# Patient Record
Sex: MALE | Race: WHITE | NOT HISPANIC OR LATINO | Employment: OTHER | URBAN - NONMETROPOLITAN AREA
[De-identification: names, ages, dates, MRNs, and addresses within clinical notes are randomized per-mention and may not be internally consistent; named-entity substitution may affect disease eponyms.]

---

## 2017-05-08 ENCOUNTER — HOSPITAL ENCOUNTER (OUTPATIENT)
Dept: GENERAL RADIOLOGY | Facility: HOSPITAL | Age: 62
Discharge: HOME | End: 2017-05-08
Attending: INTERNAL MEDICINE | Admitting: INTERNAL MEDICINE

## 2017-05-08 ENCOUNTER — TRANSCRIBE ORDERS (OUTPATIENT)
Dept: ADMINISTRATIVE | Facility: HOSPITAL | Age: 62
End: 2017-05-08

## 2017-05-08 ENCOUNTER — LAB (OUTPATIENT)
Dept: LAB | Facility: HOSPITAL | Age: 62
End: 2017-05-08
Attending: INTERNAL MEDICINE

## 2017-05-08 ENCOUNTER — HOSPITAL ENCOUNTER (OUTPATIENT)
Dept: RESPIRATORY THERAPY | Facility: HOSPITAL | Age: 62
Discharge: HOME | End: 2017-05-08
Attending: INTERNAL MEDICINE

## 2017-05-08 DIAGNOSIS — Z01.818 PREOP EXAMINATION: ICD-10-CM

## 2017-05-08 DIAGNOSIS — Z01.818 PREOP EXAMINATION: Primary | ICD-10-CM

## 2017-05-08 LAB
ALBUMIN SERPL-MCNC: 4.4 G/DL (ref 3.4–4.8)
ALBUMIN/GLOB SERPL: 1.4 G/DL (ref 1.5–2.5)
ALP SERPL-CCNC: 106 U/L (ref 40–129)
ALT SERPL W P-5'-P-CCNC: 23 U/L (ref 10–44)
ANION GAP SERPL CALCULATED.3IONS-SCNC: 3.9 MMOL/L (ref 3.6–11.2)
AST SERPL-CCNC: 23 U/L (ref 10–34)
BASOPHILS # BLD AUTO: 0.02 10*3/MM3 (ref 0–0.3)
BASOPHILS NFR BLD AUTO: 0.3 % (ref 0–2)
BILIRUB SERPL-MCNC: 1.4 MG/DL (ref 0.2–1.8)
BILIRUB UR QL STRIP: NEGATIVE
BUN BLD-MCNC: 13 MG/DL (ref 7–21)
BUN/CREAT SERPL: 13.1 (ref 7–25)
CALCIUM SPEC-SCNC: 9.8 MG/DL (ref 7.7–10)
CHLORIDE SERPL-SCNC: 106 MMOL/L (ref 99–112)
CLARITY UR: CLEAR
CO2 SERPL-SCNC: 31.1 MMOL/L (ref 24.3–31.9)
COLOR UR: YELLOW
CREAT BLD-MCNC: 0.99 MG/DL (ref 0.43–1.29)
DEPRECATED RDW RBC AUTO: 47.1 FL (ref 37–54)
EOSINOPHIL # BLD AUTO: 0.2 10*3/MM3 (ref 0–0.7)
EOSINOPHIL NFR BLD AUTO: 3 % (ref 0–5)
ERYTHROCYTE [DISTWIDTH] IN BLOOD BY AUTOMATED COUNT: 13.9 % (ref 11.5–14.5)
GFR SERPL CREATININE-BSD FRML MDRD: 77 ML/MIN/1.73
GLOBULIN UR ELPH-MCNC: 3.1 GM/DL
GLUCOSE BLD-MCNC: 94 MG/DL (ref 70–110)
GLUCOSE UR STRIP-MCNC: NEGATIVE MG/DL
HCT VFR BLD AUTO: 48.9 % (ref 42–52)
HGB BLD-MCNC: 16.5 G/DL (ref 14–18)
HGB UR QL STRIP.AUTO: NEGATIVE
IMM GRANULOCYTES # BLD: 0.02 10*3/MM3 (ref 0–0.03)
IMM GRANULOCYTES NFR BLD: 0.3 % (ref 0–0.5)
INR PPP: 0.99 (ref 0.8–1.1)
KETONES UR QL STRIP: NEGATIVE
LEUKOCYTE ESTERASE UR QL STRIP.AUTO: NEGATIVE
LYMPHOCYTES # BLD AUTO: 1.89 10*3/MM3 (ref 1–3)
LYMPHOCYTES NFR BLD AUTO: 28.8 % (ref 21–51)
MCH RBC QN AUTO: 31.5 PG (ref 27–33)
MCHC RBC AUTO-ENTMCNC: 33.7 G/DL (ref 33–37)
MCV RBC AUTO: 93.3 FL (ref 80–94)
MONOCYTES # BLD AUTO: 0.74 10*3/MM3 (ref 0.1–0.9)
MONOCYTES NFR BLD AUTO: 11.3 % (ref 0–10)
NEUTROPHILS # BLD AUTO: 3.69 10*3/MM3 (ref 1.4–6.5)
NEUTROPHILS NFR BLD AUTO: 56.3 % (ref 30–70)
NITRITE UR QL STRIP: NEGATIVE
OSMOLALITY SERPL CALC.SUM OF ELEC: 281.1 MOSM/KG (ref 273–305)
PH UR STRIP.AUTO: 6.5 [PH] (ref 5–8)
PLATELET # BLD AUTO: 165 10*3/MM3 (ref 130–400)
PMV BLD AUTO: 11.6 FL (ref 6–10)
POTASSIUM BLD-SCNC: 4.2 MMOL/L (ref 3.5–5.3)
PROT SERPL-MCNC: 7.5 G/DL (ref 6–8)
PROT UR QL STRIP: NEGATIVE
PROTHROMBIN TIME: 11 SECONDS (ref 9.8–11.9)
RBC # BLD AUTO: 5.24 10*6/MM3 (ref 4.7–6.1)
SODIUM BLD-SCNC: 141 MMOL/L (ref 135–153)
SP GR UR STRIP: 1.02 (ref 1–1.03)
UROBILINOGEN UR QL STRIP: NORMAL
WBC NRBC COR # BLD: 6.56 10*3/MM3 (ref 4.5–12.5)

## 2017-05-08 PROCEDURE — 81003 URINALYSIS AUTO W/O SCOPE: CPT | Performed by: INTERNAL MEDICINE

## 2017-05-08 PROCEDURE — 93005 ELECTROCARDIOGRAM TRACING: CPT | Performed by: INTERNAL MEDICINE

## 2017-05-08 PROCEDURE — 93010 ELECTROCARDIOGRAM REPORT: CPT | Performed by: INTERNAL MEDICINE

## 2017-05-08 PROCEDURE — 71020 XR CHEST PA AND LATERAL: CPT | Performed by: RADIOLOGY

## 2017-05-08 PROCEDURE — 85610 PROTHROMBIN TIME: CPT | Performed by: INTERNAL MEDICINE

## 2017-05-08 PROCEDURE — 80053 COMPREHEN METABOLIC PANEL: CPT | Performed by: INTERNAL MEDICINE

## 2017-05-08 PROCEDURE — 71020 HC CHEST PA AND LATERAL: CPT

## 2017-05-08 PROCEDURE — 85025 COMPLETE CBC W/AUTO DIFF WBC: CPT | Performed by: INTERNAL MEDICINE

## 2017-05-08 PROCEDURE — 36415 COLL VENOUS BLD VENIPUNCTURE: CPT

## 2017-06-22 ENCOUNTER — APPOINTMENT (OUTPATIENT)
Dept: ULTRASOUND IMAGING | Facility: HOSPITAL | Age: 62
End: 2017-06-22
Attending: EMERGENCY MEDICINE

## 2017-06-22 ENCOUNTER — HOSPITAL ENCOUNTER (EMERGENCY)
Facility: HOSPITAL | Age: 62
Discharge: HOME | End: 2017-06-22
Attending: EMERGENCY MEDICINE | Admitting: EMERGENCY MEDICINE

## 2017-06-22 ENCOUNTER — APPOINTMENT (OUTPATIENT)
Dept: CT IMAGING | Facility: HOSPITAL | Age: 62
End: 2017-06-22

## 2017-06-22 VITALS
RESPIRATION RATE: 19 BRPM | WEIGHT: 231 LBS | DIASTOLIC BLOOD PRESSURE: 89 MMHG | SYSTOLIC BLOOD PRESSURE: 149 MMHG | BODY MASS INDEX: 35.01 KG/M2 | OXYGEN SATURATION: 99 % | TEMPERATURE: 98.1 F | HEART RATE: 71 BPM | HEIGHT: 68 IN

## 2017-06-22 DIAGNOSIS — R06.02 SHORTNESS OF BREATH: Primary | ICD-10-CM

## 2017-06-22 LAB
ALBUMIN SERPL-MCNC: 4.1 G/DL (ref 3.4–4.8)
ALBUMIN/GLOB SERPL: 1.4 G/DL (ref 1.5–2.5)
ALP SERPL-CCNC: 98 U/L (ref 40–129)
ALT SERPL W P-5'-P-CCNC: 26 U/L (ref 10–44)
ANION GAP SERPL CALCULATED.3IONS-SCNC: 3.5 MMOL/L (ref 3.6–11.2)
APTT PPP: 25.7 SECONDS (ref 24.4–31)
AST SERPL-CCNC: 25 U/L (ref 10–34)
BASOPHILS # BLD AUTO: 0.02 10*3/MM3 (ref 0–0.3)
BASOPHILS NFR BLD AUTO: 0.3 % (ref 0–2)
BILIRUB SERPL-MCNC: 0.4 MG/DL (ref 0.2–1.8)
BUN BLD-MCNC: 14 MG/DL (ref 7–21)
BUN/CREAT SERPL: 15.6 (ref 7–25)
CALCIUM SPEC-SCNC: 9.6 MG/DL (ref 7.7–10)
CHLORIDE SERPL-SCNC: 109 MMOL/L (ref 99–112)
CK MB SERPL-CCNC: 1.01 NG/ML (ref 0–5)
CK MB SERPL-RTO: 1.7 % (ref 0–3)
CK SERPL-CCNC: 58 U/L (ref 24–204)
CO2 SERPL-SCNC: 30.5 MMOL/L (ref 24.3–31.9)
CREAT BLD-MCNC: 0.9 MG/DL (ref 0.43–1.29)
D DIMER PPP FEU-MCNC: 1.86 MG/L (FEU) (ref 0–0.5)
DEPRECATED RDW RBC AUTO: 43.9 FL (ref 37–54)
EOSINOPHIL # BLD AUTO: 0.6 10*3/MM3 (ref 0–0.7)
EOSINOPHIL NFR BLD AUTO: 8.5 % (ref 0–5)
ERYTHROCYTE [DISTWIDTH] IN BLOOD BY AUTOMATED COUNT: 13.5 % (ref 11.5–14.5)
GFR SERPL CREATININE-BSD FRML MDRD: 86 ML/MIN/1.73
GLOBULIN UR ELPH-MCNC: 2.9 GM/DL
GLUCOSE BLD-MCNC: 101 MG/DL (ref 70–110)
HCT VFR BLD AUTO: 44.5 % (ref 42–52)
HGB BLD-MCNC: 15.1 G/DL (ref 14–18)
IMM GRANULOCYTES # BLD: 0.03 10*3/MM3 (ref 0–0.03)
IMM GRANULOCYTES NFR BLD: 0.4 % (ref 0–0.5)
INR PPP: 0.92 (ref 0.8–1.1)
LYMPHOCYTES # BLD AUTO: 2.03 10*3/MM3 (ref 1–3)
LYMPHOCYTES NFR BLD AUTO: 28.8 % (ref 21–51)
MCH RBC QN AUTO: 31.6 PG (ref 27–33)
MCHC RBC AUTO-ENTMCNC: 33.9 G/DL (ref 33–37)
MCV RBC AUTO: 93.1 FL (ref 80–94)
MONOCYTES # BLD AUTO: 0.82 10*3/MM3 (ref 0.1–0.9)
MONOCYTES NFR BLD AUTO: 11.6 % (ref 0–10)
MYOGLOBIN SERPL-MCNC: 43 NG/ML (ref 0–109)
NEUTROPHILS # BLD AUTO: 3.55 10*3/MM3 (ref 1.4–6.5)
NEUTROPHILS NFR BLD AUTO: 50.4 % (ref 30–70)
OSMOLALITY SERPL CALC.SUM OF ELEC: 285.6 MOSM/KG (ref 273–305)
PLATELET # BLD AUTO: 198 10*3/MM3 (ref 130–400)
PMV BLD AUTO: 11.1 FL (ref 6–10)
POTASSIUM BLD-SCNC: 4.3 MMOL/L (ref 3.5–5.3)
PROT SERPL-MCNC: 7 G/DL (ref 6–8)
PROTHROMBIN TIME: 10.2 SECONDS (ref 9.8–11.9)
RBC # BLD AUTO: 4.78 10*6/MM3 (ref 4.7–6.1)
SODIUM BLD-SCNC: 143 MMOL/L (ref 135–153)
TROPONIN I SERPL-MCNC: <0.006 NG/ML
WBC NRBC COR # BLD: 7.05 10*3/MM3 (ref 4.5–12.5)

## 2017-06-22 PROCEDURE — 93971 EXTREMITY STUDY: CPT

## 2017-06-22 PROCEDURE — 85379 FIBRIN DEGRADATION QUANT: CPT | Performed by: EMERGENCY MEDICINE

## 2017-06-22 PROCEDURE — 85730 THROMBOPLASTIN TIME PARTIAL: CPT | Performed by: EMERGENCY MEDICINE

## 2017-06-22 PROCEDURE — 96360 HYDRATION IV INFUSION INIT: CPT

## 2017-06-22 PROCEDURE — 83874 ASSAY OF MYOGLOBIN: CPT | Performed by: EMERGENCY MEDICINE

## 2017-06-22 PROCEDURE — 82553 CREATINE MB FRACTION: CPT | Performed by: EMERGENCY MEDICINE

## 2017-06-22 PROCEDURE — 93005 ELECTROCARDIOGRAM TRACING: CPT | Performed by: EMERGENCY MEDICINE

## 2017-06-22 PROCEDURE — 71275 CT ANGIOGRAPHY CHEST: CPT

## 2017-06-22 PROCEDURE — 85610 PROTHROMBIN TIME: CPT | Performed by: EMERGENCY MEDICINE

## 2017-06-22 PROCEDURE — 96361 HYDRATE IV INFUSION ADD-ON: CPT

## 2017-06-22 PROCEDURE — 99283 EMERGENCY DEPT VISIT LOW MDM: CPT

## 2017-06-22 PROCEDURE — 93971 EXTREMITY STUDY: CPT | Performed by: RADIOLOGY

## 2017-06-22 PROCEDURE — 84484 ASSAY OF TROPONIN QUANT: CPT | Performed by: EMERGENCY MEDICINE

## 2017-06-22 PROCEDURE — 0 IOPAMIDOL PER 1 ML: Performed by: EMERGENCY MEDICINE

## 2017-06-22 PROCEDURE — 71275 CT ANGIOGRAPHY CHEST: CPT | Performed by: RADIOLOGY

## 2017-06-22 PROCEDURE — 85025 COMPLETE CBC W/AUTO DIFF WBC: CPT | Performed by: EMERGENCY MEDICINE

## 2017-06-22 PROCEDURE — 80053 COMPREHEN METABOLIC PANEL: CPT | Performed by: EMERGENCY MEDICINE

## 2017-06-22 PROCEDURE — 82550 ASSAY OF CK (CPK): CPT | Performed by: EMERGENCY MEDICINE

## 2017-06-22 RX ORDER — SODIUM CHLORIDE 9 MG/ML
125 INJECTION, SOLUTION INTRAVENOUS CONTINUOUS
Status: DISCONTINUED | OUTPATIENT
Start: 2017-06-22 | End: 2017-06-22 | Stop reason: HOSPADM

## 2017-06-22 RX ORDER — MELOXICAM 7.5 MG/1
7.5 TABLET ORAL DAILY
COMMUNITY
End: 2019-05-07

## 2017-06-22 RX ORDER — AMOXICILLIN AND CLAVULANATE POTASSIUM 500; 125 MG/1; MG/1
1 TABLET, FILM COATED ORAL AS NEEDED
COMMUNITY
End: 2019-05-07

## 2017-06-22 RX ADMIN — SODIUM CHLORIDE 125 ML/HR: 9 INJECTION, SOLUTION INTRAVENOUS at 16:51

## 2017-06-22 RX ADMIN — SODIUM CHLORIDE 500 ML: 9 INJECTION, SOLUTION INTRAVENOUS at 17:15

## 2017-06-22 RX ADMIN — IOPAMIDOL 100 ML: 755 INJECTION, SOLUTION INTRAVENOUS at 18:13

## 2017-06-23 NOTE — DISCHARGE INSTRUCTIONS
Home to rest in care of family.  See Dr. Iglesias in the office tomorrow.  Return the emergency Department right away if symptoms worsen/any problems.    Hypertension  Hypertension, commonly called high blood pressure, is when the force of blood pumping through your arteries is too strong. Your arteries are the blood vessels that carry blood from your heart throughout your body. A blood pressure reading consists of a higher number over a lower number, such as 110/72. The higher number (systolic) is the pressure inside your arteries when your heart pumps. The lower number (diastolic) is the pressure inside your arteries when your heart relaxes. Ideally you want your blood pressure below 120/80.  Hypertension forces your heart to work harder to pump blood. Your arteries may become narrow or stiff. Having untreated or uncontrolled hypertension can cause heart attack, stroke, kidney disease, and other problems.  RISK FACTORS  Some risk factors for high blood pressure are controllable. Others are not.   Risk factors you cannot control include:   · Race. You may be at higher risk if you are .  · Age. Risk increases with age.  · Gender. Men are at higher risk than women before age 45 years. After age 65, women are at higher risk than men.  Risk factors you can control include:  · Not getting enough exercise or physical activity.  · Being overweight.  · Getting too much fat, sugar, calories, or salt in your diet.  · Drinking too much alcohol.  SIGNS AND SYMPTOMS  Hypertension does not usually cause signs or symptoms. Extremely high blood pressure (hypertensive crisis) may cause headache, anxiety, shortness of breath, and nosebleed.  DIAGNOSIS  To check if you have hypertension, your health care provider will measure your blood pressure while you are seated, with your arm held at the level of your heart. It should be measured at least twice using the same arm. Certain conditions can cause a difference in blood  pressure between your right and left arms. A blood pressure reading that is higher than normal on one occasion does not mean that you need treatment. If it is not clear whether you have high blood pressure, you may be asked to return on a different day to have your blood pressure checked again. Or, you may be asked to monitor your blood pressure at home for 1 or more weeks.  TREATMENT  Treating high blood pressure includes making lifestyle changes and possibly taking medicine. Living a healthy lifestyle can help lower high blood pressure. You may need to change some of your habits.  Lifestyle changes may include:  · Following the DASH diet. This diet is high in fruits, vegetables, and whole grains. It is low in salt, red meat, and added sugars.  · Keep your sodium intake below 2,300 mg per day.  · Getting at least 30-45 minutes of aerobic exercise at least 4 times per week.  · Losing weight if necessary.  · Not smoking.  · Limiting alcoholic beverages.  · Learning ways to reduce stress.  Your health care provider may prescribe medicine if lifestyle changes are not enough to get your blood pressure under control, and if one of the following is true:  · You are 18-59 years of age and your systolic blood pressure is above 140.  · You are 60 years of age or older, and your systolic blood pressure is above 150.  · Your diastolic blood pressure is above 90.  · You have diabetes, and your systolic blood pressure is over 140 or your diastolic blood pressure is over 90.  · You have kidney disease and your blood pressure is above 140/90.  · You have heart disease and your blood pressure is above 140/90.  Your personal target blood pressure may vary depending on your medical conditions, your age, and other factors.  HOME CARE INSTRUCTIONS  · Have your blood pressure rechecked as directed by your health care provider.    · Take medicines only as directed by your health care provider. Follow the directions carefully. Blood  pressure medicines must be taken as prescribed. The medicine does not work as well when you skip doses. Skipping doses also puts you at risk for problems.  · Do not smoke.    · Monitor your blood pressure at home as directed by your health care provider.   SEEK MEDICAL CARE IF:   · You think you are having a reaction to medicines taken.  · You have recurrent headaches or feel dizzy.  · You have swelling in your ankles.  · You have trouble with your vision.  SEEK IMMEDIATE MEDICAL CARE IF:  · You develop a severe headache or confusion.  · You have unusual weakness, numbness, or feel faint.  · You have severe chest or abdominal pain.  · You vomit repeatedly.  · You have trouble breathing.  MAKE SURE YOU:   · Understand these instructions.  · Will watch your condition.  · Will get help right away if you are not doing well or get worse.     This information is not intended to replace advice given to you by your health care provider. Make sure you discuss any questions you have with your health care provider.     Document Released: 12/18/2006 Document Revised: 05/03/2016 Document Reviewed: 10/10/2014  [employer] Interactive Patient Education ©2017 [employer]

## 2017-06-23 NOTE — ED PROVIDER NOTES
Subjective   HPI Comments: Patient is 62-year-old white male who is sent here from Dr. Iglesias's office today for evaluation of shortness of breath.  He states that he has been feeling mildly and vaguely short of breath off and on now for about 2 weeks, seems to been more prominent over the last 2 days.  He did have a partial left knee replacement 4 weeks ago, states that his left leg has been swollen since that time but that if his service and steadily been improving, and is much better now than it was initially.  He was seen at Dr. Iglesias's office this afternoon and was sent here for further evaluation.  No fever, chills, chest pain, cough, abdominal pain, nausea, vomiting, diaphoresis, any other associated symptoms or other complaints.    Patient is a 62 y.o. male presenting with shortness of breath.   History provided by:  Patient and spouse  Shortness of Breath   Associated symptoms: no abdominal pain, no chest pain, no claudication, no cough, no diaphoresis, no fever, no headaches, no hemoptysis, no neck pain, no PND, no rash, no sore throat, no sputum production, no syncope, no vomiting and no wheezing        Review of Systems   Constitutional: Negative for activity change, appetite change, diaphoresis and fever.   HENT: Negative for congestion, ear discharge, sinus pressure and sore throat.    Eyes: Negative for photophobia and pain.   Respiratory: Positive for shortness of breath. Negative for cough, hemoptysis, sputum production, wheezing and stridor.    Cardiovascular: Negative for chest pain, claudication, leg swelling, syncope and PND.   Gastrointestinal: Negative for abdominal distention, abdominal pain, diarrhea, nausea and vomiting.   Endocrine: Negative for polydipsia and polyphagia.   Genitourinary: Negative for difficulty urinating and flank pain.   Musculoskeletal: Negative for arthralgias, myalgias, neck pain and neck stiffness.   Skin: Negative for color change and rash.   Neurological: Negative  for dizziness, seizures, syncope and headaches.   Psychiatric/Behavioral: Negative for confusion.   All other systems reviewed and are negative.      Past Medical History:   Diagnosis Date   • Arthritis    • Atrial fibrillation     noted during stress test 7/2015. Spont. converted to SR.   • BPH (benign prostatic hyperplasia)    • GERD (gastroesophageal reflux disease)        Allergies   Allergen Reactions   • Sulfa Antibiotics        Past Surgical History:   Procedure Laterality Date   • CARPAL TUNNEL RELEASE Bilateral    • CHOLECYSTECTOMY     • KNEE SURGERY Left        Family History   Problem Relation Age of Onset   • Heart attack Father        Social History     Social History   • Marital status:      Spouse name: N/A   • Number of children: N/A   • Years of education: N/A     Social History Main Topics   • Smoking status: Never Smoker   • Smokeless tobacco: None      Comment: history of smokeless tobacco use.   • Alcohol use No   • Drug use: No   • Sexual activity: Defer     Other Topics Concern   • None     Social History Narrative           Objective   Physical Exam   Constitutional: He is oriented to person, place, and time. He appears well-developed and well-nourished. No distress.   Patient is in very good spirits.  He appears comfortable and well.   HENT:   Head: Normocephalic and atraumatic.   Eyes: EOM are normal. Pupils are equal, round, and reactive to light. No scleral icterus.   Neck: Normal range of motion. Neck supple. No rigidity. No tracheal deviation present.   Cardiovascular: Normal rate, regular rhythm and intact distal pulses.    Pulmonary/Chest: Effort normal and breath sounds normal. No respiratory distress. He exhibits no tenderness.   Abdominal: Soft. Bowel sounds are normal. He exhibits no distension. There is no tenderness. There is no rebound and no guarding.   Musculoskeletal: Normal range of motion. He exhibits edema (Mild edema left lower extremity.  No redness or increased  warmth.  No Homans sign.). He exhibits no tenderness.   Neurological: He is alert and oriented to person, place, and time. He has normal strength. No sensory deficit. He exhibits normal muscle tone. Coordination normal. GCS eye subscore is 4. GCS verbal subscore is 5. GCS motor subscore is 6.   Skin: Skin is warm and dry. He is not diaphoretic. No cyanosis. No pallor.   Psychiatric: He has a normal mood and affect. His behavior is normal.   Vitals reviewed.      Procedures  CT Chest Pulmonary Embolism With Contrast   ED Interpretation   Per virtual radiology, no evidence for pulmonary embolus.      US Venous Doppler Lower Extremity Left (duplex)   ED Interpretation   Per virtual radiology, no evidence for DVT.      EKG shows sinus rhythm with no apparent acute ischemia.  Results for orders placed or performed during the hospital encounter of 06/22/17   Comprehensive Metabolic Panel   Result Value Ref Range    Glucose 101 70 - 110 mg/dL    BUN 14 7 - 21 mg/dL    Creatinine 0.90 0.43 - 1.29 mg/dL    Sodium 143 135 - 153 mmol/L    Potassium 4.3 3.5 - 5.3 mmol/L    Chloride 109 99 - 112 mmol/L    CO2 30.5 24.3 - 31.9 mmol/L    Calcium 9.6 7.7 - 10.0 mg/dL    Total Protein 7.0 6.0 - 8.0 g/dL    Albumin 4.10 3.40 - 4.80 g/dL    ALT (SGPT) 26 10 - 44 U/L    AST (SGOT) 25 10 - 34 U/L    Alkaline Phosphatase 98 40 - 129 U/L    Total Bilirubin 0.4 0.2 - 1.8 mg/dL    eGFR Non African Amer 86 >60 mL/min/1.73    Globulin 2.9 gm/dL    A/G Ratio 1.4 (L) 1.5 - 2.5 g/dL    BUN/Creatinine Ratio 15.6 7.0 - 25.0    Anion Gap 3.5 (L) 3.6 - 11.2 mmol/L   Protime-INR   Result Value Ref Range    Protime 10.2 9.8 - 11.9 Seconds    INR 0.92 0.80 - 1.10   aPTT   Result Value Ref Range    PTT 25.7 24.4 - 31.0 seconds   CK   Result Value Ref Range    Creatine Kinase 58 24 - 204 U/L   Myoglobin, Serum   Result Value Ref Range    Myoglobin 43.0 0.0 - 109.0 ng/mL   CK-MB   Result Value Ref Range    CKMB 1.01 0.00 - 5.00 ng/mL   Troponin   Result  Value Ref Range    Troponin I <0.006 <=0.040 ng/mL   D-dimer, Quantitative   Result Value Ref Range    D-Dimer, Quantitative 1.86 (C) 0.00 - 0.50 mg/L (FEU)   CBC Auto Differential   Result Value Ref Range    WBC 7.05 4.50 - 12.50 10*3/mm3    RBC 4.78 4.70 - 6.10 10*6/mm3    Hemoglobin 15.1 14.0 - 18.0 g/dL    Hematocrit 44.5 42.0 - 52.0 %    MCV 93.1 80.0 - 94.0 fL    MCH 31.6 27.0 - 33.0 pg    MCHC 33.9 33.0 - 37.0 g/dL    RDW 13.5 11.5 - 14.5 %    RDW-SD 43.9 37.0 - 54.0 fl    MPV 11.1 (H) 6.0 - 10.0 fL    Platelets 198 130 - 400 10*3/mm3    Neutrophil % 50.4 30.0 - 70.0 %    Lymphocyte % 28.8 21.0 - 51.0 %    Monocyte % 11.6 (H) 0.0 - 10.0 %    Eosinophil % 8.5 (H) 0.0 - 5.0 %    Basophil % 0.3 0.0 - 2.0 %    Immature Grans % 0.4 0.0 - 0.5 %    Neutrophils, Absolute 3.55 1.40 - 6.50 10*3/mm3    Lymphocytes, Absolute 2.03 1.00 - 3.00 10*3/mm3    Monocytes, Absolute 0.82 0.10 - 0.90 10*3/mm3    Eosinophils, Absolute 0.60 0.00 - 0.70 10*3/mm3    Basophils, Absolute 0.02 0.00 - 0.30 10*3/mm3    Immature Grans, Absolute 0.03 0.00 - 0.03 10*3/mm3   Osmolality, Calculated   Result Value Ref Range    Osmolality Calc 285.6 273.0 - 305.0 mOsm/kg   CK-MB Index   Result Value Ref Range    CK-MB Index 1.7 0.0 - 3.0 %              ED Course  ED Course   Patient's emergency department stay has been entirely uneventful.  Never has he shown any signs of distress.  I discussed case with Dr. Iglesias, who advises that we can admit the patient to the hospital for observation and further testing or, if patient prefers, we can discharge him to home to follow-up with Dr. Iglesias in the office tomorrow.  Patient prefers to go home and to follow-up with Dr. Iglesias in the office tomorrow.  He agrees that he will return to the emergency Department right away for symptoms worsen or if he has any problems.               MDM  Number of Diagnoses or Management Options  Shortness of breath:       Final diagnoses:   Shortness of breath              Please note that portions of this note were completed with a voice recognition program. Efforts were made to edit the dictations, but occasionally words are mistranscribed.       Temo Rodriguez MD  06/22/17 2129

## 2017-12-08 ENCOUNTER — LAB (OUTPATIENT)
Dept: LAB | Facility: HOSPITAL | Age: 62
End: 2017-12-08
Attending: INTERNAL MEDICINE

## 2017-12-08 ENCOUNTER — TRANSCRIBE ORDERS (OUTPATIENT)
Dept: ADMINISTRATIVE | Facility: HOSPITAL | Age: 62
End: 2017-12-08

## 2017-12-08 ENCOUNTER — HOSPITAL ENCOUNTER (OUTPATIENT)
Dept: CARDIOLOGY | Facility: HOSPITAL | Age: 62
Discharge: HOME | End: 2017-12-08
Attending: INTERNAL MEDICINE | Admitting: INTERNAL MEDICINE

## 2017-12-08 DIAGNOSIS — R60.0 LEG EDEMA: ICD-10-CM

## 2017-12-08 DIAGNOSIS — A41.9 SEPTICEMIC (HCC): Primary | ICD-10-CM

## 2017-12-08 DIAGNOSIS — R60.0 LEG EDEMA: Primary | ICD-10-CM

## 2017-12-08 DIAGNOSIS — A41.9 SEPTICEMIC (HCC): ICD-10-CM

## 2017-12-08 DIAGNOSIS — R53.82 CHRONIC FATIGUE: ICD-10-CM

## 2017-12-08 DIAGNOSIS — R60.0 LOCALIZED EDEMA: ICD-10-CM

## 2017-12-08 LAB
ALBUMIN SERPL-MCNC: 4.5 G/DL (ref 3.4–4.8)
ALBUMIN/GLOB SERPL: 1.6 G/DL (ref 1.5–2.5)
ALP SERPL-CCNC: 96 U/L (ref 40–129)
ALT SERPL W P-5'-P-CCNC: 27 U/L (ref 10–44)
ANION GAP SERPL CALCULATED.3IONS-SCNC: 5.7 MMOL/L (ref 3.6–11.2)
AST SERPL-CCNC: 23 U/L (ref 10–34)
BASOPHILS # BLD AUTO: 0.02 10*3/MM3 (ref 0–0.3)
BASOPHILS NFR BLD AUTO: 0.2 % (ref 0–2)
BILIRUB SERPL-MCNC: 0.7 MG/DL (ref 0.2–1.8)
BUN BLD-MCNC: 13 MG/DL (ref 7–21)
BUN/CREAT SERPL: 12.5 (ref 7–25)
CALCIUM SPEC-SCNC: 9.8 MG/DL (ref 7.7–10)
CHLORIDE SERPL-SCNC: 99 MMOL/L (ref 99–112)
CO2 SERPL-SCNC: 29.3 MMOL/L (ref 24.3–31.9)
CREAT BLD-MCNC: 1.04 MG/DL (ref 0.43–1.29)
CRP SERPL-MCNC: <0.5 MG/DL (ref 0–0.99)
D DIMER PPP FEU-MCNC: <0.27 MCGFEU/ML (ref 0–0.5)
DEPRECATED RDW RBC AUTO: 46.3 FL (ref 37–54)
EOSINOPHIL # BLD AUTO: 0.14 10*3/MM3 (ref 0–0.7)
EOSINOPHIL NFR BLD AUTO: 1.4 % (ref 0–5)
ERYTHROCYTE [DISTWIDTH] IN BLOOD BY AUTOMATED COUNT: 14.1 % (ref 11.5–14.5)
GFR SERPL CREATININE-BSD FRML MDRD: 73 ML/MIN/1.73
GLOBULIN UR ELPH-MCNC: 2.8 GM/DL
GLUCOSE BLD-MCNC: 99 MG/DL (ref 70–110)
HCT VFR BLD AUTO: 48.8 % (ref 42–52)
HGB BLD-MCNC: 17 G/DL (ref 14–18)
IMM GRANULOCYTES # BLD: 0.03 10*3/MM3 (ref 0–0.03)
IMM GRANULOCYTES NFR BLD: 0.3 % (ref 0–0.5)
LYMPHOCYTES # BLD AUTO: 1.41 10*3/MM3 (ref 1–3)
LYMPHOCYTES NFR BLD AUTO: 13.8 % (ref 21–51)
MCH RBC QN AUTO: 31.8 PG (ref 27–33)
MCHC RBC AUTO-ENTMCNC: 34.8 G/DL (ref 33–37)
MCV RBC AUTO: 91.2 FL (ref 80–94)
MONOCYTES # BLD AUTO: 0.85 10*3/MM3 (ref 0.1–0.9)
MONOCYTES NFR BLD AUTO: 8.3 % (ref 0–10)
NEUTROPHILS # BLD AUTO: 7.74 10*3/MM3 (ref 1.4–6.5)
NEUTROPHILS NFR BLD AUTO: 76 % (ref 30–70)
OSMOLALITY SERPL CALC.SUM OF ELEC: 268.4 MOSM/KG (ref 273–305)
PLATELET # BLD AUTO: 198 10*3/MM3 (ref 130–400)
PMV BLD AUTO: 11.3 FL (ref 6–10)
POTASSIUM BLD-SCNC: 4.3 MMOL/L (ref 3.5–5.3)
PROT SERPL-MCNC: 7.3 G/DL (ref 6–8)
RBC # BLD AUTO: 5.35 10*6/MM3 (ref 4.7–6.1)
SODIUM BLD-SCNC: 134 MMOL/L (ref 135–153)
WBC NRBC COR # BLD: 10.19 10*3/MM3 (ref 4.5–12.5)

## 2017-12-08 PROCEDURE — 85025 COMPLETE CBC W/AUTO DIFF WBC: CPT

## 2017-12-08 PROCEDURE — 93970 EXTREMITY STUDY: CPT | Performed by: RADIOLOGY

## 2017-12-08 PROCEDURE — 93970 EXTREMITY STUDY: CPT

## 2017-12-08 PROCEDURE — 86140 C-REACTIVE PROTEIN: CPT

## 2017-12-08 PROCEDURE — 36415 COLL VENOUS BLD VENIPUNCTURE: CPT

## 2017-12-08 PROCEDURE — 87040 BLOOD CULTURE FOR BACTERIA: CPT

## 2017-12-08 PROCEDURE — 80053 COMPREHEN METABOLIC PANEL: CPT

## 2017-12-08 PROCEDURE — 85379 FIBRIN DEGRADATION QUANT: CPT

## 2017-12-13 LAB
BACTERIA SPEC AEROBE CULT: NORMAL
BACTERIA SPEC AEROBE CULT: NORMAL

## 2018-08-10 ENCOUNTER — TRANSCRIBE ORDERS (OUTPATIENT)
Dept: PULMONOLOGY | Facility: HOSPITAL | Age: 63
End: 2018-08-10

## 2018-08-10 ENCOUNTER — HOSPITAL ENCOUNTER (OUTPATIENT)
Dept: GENERAL RADIOLOGY | Facility: HOSPITAL | Age: 63
Discharge: HOME | End: 2018-08-10
Attending: INTERNAL MEDICINE | Admitting: INTERNAL MEDICINE

## 2018-08-10 DIAGNOSIS — M25.512 LEFT SHOULDER PAIN, UNSPECIFIED CHRONICITY: Primary | ICD-10-CM

## 2018-08-10 DIAGNOSIS — M25.512 LEFT SHOULDER PAIN, UNSPECIFIED CHRONICITY: ICD-10-CM

## 2018-08-10 PROCEDURE — 73030 X-RAY EXAM OF SHOULDER: CPT | Performed by: RADIOLOGY

## 2018-08-10 PROCEDURE — 73030 X-RAY EXAM OF SHOULDER: CPT

## 2018-08-17 ENCOUNTER — LAB (OUTPATIENT)
Dept: LAB | Facility: HOSPITAL | Age: 63
End: 2018-08-17
Attending: INTERNAL MEDICINE

## 2018-08-17 ENCOUNTER — TRANSCRIBE ORDERS (OUTPATIENT)
Dept: ADMINISTRATIVE | Facility: HOSPITAL | Age: 63
End: 2018-08-17

## 2018-08-17 DIAGNOSIS — Z12.5 SPECIAL SCREENING FOR MALIGNANT NEOPLASM OF PROSTATE: Primary | ICD-10-CM

## 2018-08-17 DIAGNOSIS — R73.01 ELEVATED FASTING BLOOD SUGAR: ICD-10-CM

## 2018-08-17 DIAGNOSIS — Z12.5 SPECIAL SCREENING FOR MALIGNANT NEOPLASM OF PROSTATE: ICD-10-CM

## 2018-08-17 DIAGNOSIS — R53.82 CHRONIC FATIGUE: ICD-10-CM

## 2018-08-17 DIAGNOSIS — E78.5 HYPERLIPIDEMIA, UNSPECIFIED HYPERLIPIDEMIA TYPE: ICD-10-CM

## 2018-08-17 DIAGNOSIS — R60.0 EDEMA EXTREMITIES: ICD-10-CM

## 2018-08-17 LAB
ALBUMIN SERPL-MCNC: 4.3 G/DL (ref 3.4–4.8)
ALBUMIN/GLOB SERPL: 1.7 G/DL (ref 1.5–2.5)
ALP SERPL-CCNC: 89 U/L (ref 40–129)
ALT SERPL W P-5'-P-CCNC: 24 U/L (ref 10–44)
ANION GAP SERPL CALCULATED.3IONS-SCNC: 7.7 MMOL/L (ref 3.6–11.2)
AST SERPL-CCNC: 21 U/L (ref 10–34)
BASOPHILS # BLD AUTO: 0.02 10*3/MM3 (ref 0–0.3)
BASOPHILS NFR BLD AUTO: 0.2 % (ref 0–2)
BILIRUB SERPL-MCNC: 0.5 MG/DL (ref 0.2–1.8)
BUN BLD-MCNC: 14 MG/DL (ref 7–21)
BUN/CREAT SERPL: 15.2 (ref 7–25)
CALCIUM SPEC-SCNC: 8.9 MG/DL (ref 7.7–10)
CHLORIDE SERPL-SCNC: 108 MMOL/L (ref 99–112)
CHOLEST SERPL-MCNC: 170 MG/DL (ref 0–200)
CO2 SERPL-SCNC: 23.3 MMOL/L (ref 24.3–31.9)
CREAT BLD-MCNC: 0.92 MG/DL (ref 0.43–1.29)
DEPRECATED RDW RBC AUTO: 44.7 FL (ref 37–54)
EOSINOPHIL # BLD AUTO: 0.33 10*3/MM3 (ref 0–0.7)
EOSINOPHIL NFR BLD AUTO: 4.1 % (ref 0–5)
ERYTHROCYTE [DISTWIDTH] IN BLOOD BY AUTOMATED COUNT: 13.6 % (ref 11.5–14.5)
FOLATE SERPL-MCNC: 9.78 NG/ML (ref 5.4–20)
GFR SERPL CREATININE-BSD FRML MDRD: 83 ML/MIN/1.73
GLOBULIN UR ELPH-MCNC: 2.5 GM/DL
GLUCOSE BLD-MCNC: 90 MG/DL (ref 70–110)
HCT VFR BLD AUTO: 48 % (ref 42–52)
HDLC SERPL-MCNC: 43 MG/DL (ref 60–100)
HGB BLD-MCNC: 16.8 G/DL (ref 14–18)
IMM GRANULOCYTES # BLD: 0.02 10*3/MM3 (ref 0–0.03)
IMM GRANULOCYTES NFR BLD: 0.2 % (ref 0–0.5)
LDLC SERPL CALC-MCNC: 110 MG/DL (ref 0–100)
LDLC/HDLC SERPL: 2.56 {RATIO}
LYMPHOCYTES # BLD AUTO: 1.99 10*3/MM3 (ref 1–3)
LYMPHOCYTES NFR BLD AUTO: 24.7 % (ref 21–51)
MCH RBC QN AUTO: 31.8 PG (ref 27–33)
MCHC RBC AUTO-ENTMCNC: 35 G/DL (ref 33–37)
MCV RBC AUTO: 90.9 FL (ref 80–94)
MONOCYTES # BLD AUTO: 0.85 10*3/MM3 (ref 0.1–0.9)
MONOCYTES NFR BLD AUTO: 10.6 % (ref 0–10)
NEUTROPHILS # BLD AUTO: 4.84 10*3/MM3 (ref 1.4–6.5)
NEUTROPHILS NFR BLD AUTO: 60.2 % (ref 30–70)
OSMOLALITY SERPL CALC.SUM OF ELEC: 277.5 MOSM/KG (ref 273–305)
PLATELET # BLD AUTO: 164 10*3/MM3 (ref 130–400)
PMV BLD AUTO: 11.3 FL (ref 6–10)
POTASSIUM BLD-SCNC: 4.3 MMOL/L (ref 3.5–5.3)
PROT SERPL-MCNC: 6.8 G/DL (ref 6–8)
PSA SERPL-MCNC: 0.18 NG/ML (ref 0–4)
RBC # BLD AUTO: 5.28 10*6/MM3 (ref 4.7–6.1)
SODIUM BLD-SCNC: 139 MMOL/L (ref 135–153)
TRIGL SERPL-MCNC: 84 MG/DL (ref 0–150)
TSH SERPL DL<=0.05 MIU/L-ACNC: 3.63 MIU/ML (ref 0.55–4.78)
VIT B12 BLD-MCNC: 669 PG/ML (ref 211–911)
VLDLC SERPL-MCNC: 16.8 MG/DL
WBC NRBC COR # BLD: 8.05 10*3/MM3 (ref 4.5–12.5)

## 2018-08-17 PROCEDURE — G0103 PSA SCREENING: HCPCS

## 2018-08-17 PROCEDURE — 82607 VITAMIN B-12: CPT

## 2018-08-17 PROCEDURE — 85025 COMPLETE CBC W/AUTO DIFF WBC: CPT

## 2018-08-17 PROCEDURE — 82746 ASSAY OF FOLIC ACID SERUM: CPT

## 2018-08-17 PROCEDURE — 80053 COMPREHEN METABOLIC PANEL: CPT

## 2018-08-17 PROCEDURE — 36415 COLL VENOUS BLD VENIPUNCTURE: CPT

## 2018-08-17 PROCEDURE — 84443 ASSAY THYROID STIM HORMONE: CPT

## 2018-08-17 PROCEDURE — 80061 LIPID PANEL: CPT

## 2019-04-05 ENCOUNTER — LAB (OUTPATIENT)
Dept: LAB | Facility: HOSPITAL | Age: 64
End: 2019-04-05

## 2019-04-05 ENCOUNTER — TRANSCRIBE ORDERS (OUTPATIENT)
Dept: ADMINISTRATIVE | Facility: HOSPITAL | Age: 64
End: 2019-04-05

## 2019-04-05 DIAGNOSIS — R73.01 IMPAIRED FASTING GLUCOSE: ICD-10-CM

## 2019-04-05 DIAGNOSIS — R06.02 SHORTNESS OF BREATH: ICD-10-CM

## 2019-04-05 DIAGNOSIS — R07.9 CHEST PAIN, UNSPECIFIED TYPE: ICD-10-CM

## 2019-04-05 DIAGNOSIS — E78.5 HYPERLIPIDEMIA, UNSPECIFIED HYPERLIPIDEMIA TYPE: ICD-10-CM

## 2019-04-05 DIAGNOSIS — E78.5 HYPERLIPIDEMIA, UNSPECIFIED HYPERLIPIDEMIA TYPE: Primary | ICD-10-CM

## 2019-04-05 DIAGNOSIS — K21.00 REFLUX ESOPHAGITIS: ICD-10-CM

## 2019-04-05 LAB
ALBUMIN SERPL-MCNC: 4.1 G/DL (ref 3.5–5.2)
ALBUMIN/GLOB SERPL: 1.4 G/DL
ALP SERPL-CCNC: 74 U/L (ref 39–117)
ALT SERPL W P-5'-P-CCNC: 19 U/L (ref 1–41)
ANION GAP SERPL CALCULATED.3IONS-SCNC: 11.3 MMOL/L
AST SERPL-CCNC: 19 U/L (ref 1–40)
BASOPHILS # BLD AUTO: 0.03 10*3/MM3 (ref 0–0.2)
BASOPHILS NFR BLD AUTO: 0.5 % (ref 0–1.5)
BILIRUB SERPL-MCNC: 0.8 MG/DL (ref 0.2–1.2)
BUN BLD-MCNC: 11 MG/DL (ref 8–23)
BUN/CREAT SERPL: 10.6 (ref 7–25)
CALCIUM SPEC-SCNC: 9.2 MG/DL (ref 8.6–10.5)
CHLORIDE SERPL-SCNC: 103 MMOL/L (ref 98–107)
CHOLEST SERPL-MCNC: 174 MG/DL (ref 0–200)
CO2 SERPL-SCNC: 26.7 MMOL/L (ref 22–29)
CREAT BLD-MCNC: 1.04 MG/DL (ref 0.76–1.27)
D DIMER PPP FEU-MCNC: 0.42 MCGFEU/ML (ref 0–0.5)
DEPRECATED RDW RBC AUTO: 47.7 FL (ref 37–54)
EOSINOPHIL # BLD AUTO: 0.13 10*3/MM3 (ref 0–0.4)
EOSINOPHIL NFR BLD AUTO: 2.2 % (ref 0.3–6.2)
ERYTHROCYTE [DISTWIDTH] IN BLOOD BY AUTOMATED COUNT: 13.6 % (ref 12.3–15.4)
GFR SERPL CREATININE-BSD FRML MDRD: 72 ML/MIN/1.73
GLOBULIN UR ELPH-MCNC: 2.9 GM/DL
GLUCOSE BLD-MCNC: 87 MG/DL (ref 65–99)
HCT VFR BLD AUTO: 49.2 % (ref 37.5–51)
HDLC SERPL-MCNC: 44 MG/DL (ref 40–60)
HGB BLD-MCNC: 15.8 G/DL (ref 13–17.7)
IMM GRANULOCYTES # BLD AUTO: 0.02 10*3/MM3 (ref 0–0.05)
IMM GRANULOCYTES NFR BLD AUTO: 0.3 % (ref 0–0.5)
LDLC SERPL CALC-MCNC: 117 MG/DL (ref 0–100)
LDLC/HDLC SERPL: 2.66 {RATIO}
LYMPHOCYTES # BLD AUTO: 1.86 10*3/MM3 (ref 0.7–3.1)
LYMPHOCYTES NFR BLD AUTO: 30.9 % (ref 19.6–45.3)
MCH RBC QN AUTO: 30.4 PG (ref 26.6–33)
MCHC RBC AUTO-ENTMCNC: 32.1 G/DL (ref 31.5–35.7)
MCV RBC AUTO: 94.6 FL (ref 79–97)
MONOCYTES # BLD AUTO: 0.61 10*3/MM3 (ref 0.1–0.9)
MONOCYTES NFR BLD AUTO: 10.1 % (ref 5–12)
NEUTROPHILS # BLD AUTO: 3.37 10*3/MM3 (ref 1.4–7)
NEUTROPHILS NFR BLD AUTO: 56 % (ref 42.7–76)
NRBC BLD AUTO-RTO: 0 /100 WBC (ref 0–0)
PLATELET # BLD AUTO: 172 10*3/MM3 (ref 140–450)
PMV BLD AUTO: 11.9 FL (ref 6–12)
POTASSIUM BLD-SCNC: 4.7 MMOL/L (ref 3.5–5.2)
PROT SERPL-MCNC: 7 G/DL (ref 6–8.5)
RBC # BLD AUTO: 5.2 10*6/MM3 (ref 4.14–5.8)
SODIUM BLD-SCNC: 141 MMOL/L (ref 136–145)
T-UPTAKE NFR SERPL: 1.12 TBI (ref 0.8–1.3)
T4 SERPL-MCNC: 7.34 MCG/DL (ref 4.5–11.7)
TRIGL SERPL-MCNC: 65 MG/DL (ref 0–150)
TROPONIN T SERPL-MCNC: <0.01 NG/ML (ref 0–0.03)
TSH SERPL DL<=0.05 MIU/L-ACNC: 1.84 MIU/ML (ref 0.27–4.2)
VLDLC SERPL-MCNC: 13 MG/DL (ref 5–40)
WBC NRBC COR # BLD: 6.02 10*3/MM3 (ref 3.4–10.8)

## 2019-04-05 PROCEDURE — 36415 COLL VENOUS BLD VENIPUNCTURE: CPT

## 2019-04-05 PROCEDURE — 80053 COMPREHEN METABOLIC PANEL: CPT

## 2019-04-05 PROCEDURE — 85379 FIBRIN DEGRADATION QUANT: CPT

## 2019-04-05 PROCEDURE — 85025 COMPLETE CBC W/AUTO DIFF WBC: CPT

## 2019-04-05 PROCEDURE — 80061 LIPID PANEL: CPT

## 2019-04-05 PROCEDURE — 84443 ASSAY THYROID STIM HORMONE: CPT

## 2019-04-05 PROCEDURE — 84479 ASSAY OF THYROID (T3 OR T4): CPT

## 2019-04-05 PROCEDURE — 84436 ASSAY OF TOTAL THYROXINE: CPT

## 2019-04-05 PROCEDURE — 84484 ASSAY OF TROPONIN QUANT: CPT

## 2019-05-07 ENCOUNTER — APPOINTMENT (OUTPATIENT)
Dept: PREADMISSION TESTING | Facility: HOSPITAL | Age: 64
End: 2019-05-07

## 2019-05-07 ENCOUNTER — OFFICE VISIT (OUTPATIENT)
Dept: CARDIOLOGY | Facility: CLINIC | Age: 64
End: 2019-05-07

## 2019-05-07 VITALS
HEART RATE: 65 BPM | WEIGHT: 227 LBS | SYSTOLIC BLOOD PRESSURE: 111 MMHG | DIASTOLIC BLOOD PRESSURE: 76 MMHG | BODY MASS INDEX: 34.4 KG/M2 | HEIGHT: 68 IN

## 2019-05-07 DIAGNOSIS — E66.9 OBESITY (BMI 30.0-34.9): ICD-10-CM

## 2019-05-07 DIAGNOSIS — E78.5 DYSLIPIDEMIA: ICD-10-CM

## 2019-05-07 DIAGNOSIS — R07.9 CHEST PAIN, UNSPECIFIED TYPE: Primary | ICD-10-CM

## 2019-05-07 DIAGNOSIS — R07.9 CHEST PAIN, UNSPECIFIED TYPE: ICD-10-CM

## 2019-05-07 LAB
ANION GAP SERPL CALCULATED.3IONS-SCNC: 12 MMOL/L
BUN BLD-MCNC: 11 MG/DL (ref 8–23)
BUN/CREAT SERPL: 12.9 (ref 7–25)
CALCIUM SPEC-SCNC: 9.4 MG/DL (ref 8.6–10.5)
CHLORIDE SERPL-SCNC: 102 MMOL/L (ref 98–107)
CO2 SERPL-SCNC: 24 MMOL/L (ref 22–29)
CREAT BLD-MCNC: 0.85 MG/DL (ref 0.76–1.27)
DEPRECATED RDW RBC AUTO: 46.3 FL (ref 37–54)
ERYTHROCYTE [DISTWIDTH] IN BLOOD BY AUTOMATED COUNT: 13.5 % (ref 12.3–15.4)
GFR SERPL CREATININE-BSD FRML MDRD: 91 ML/MIN/1.73
GLUCOSE BLD-MCNC: 89 MG/DL (ref 65–99)
HCT VFR BLD AUTO: 48.2 % (ref 37.5–51)
HGB BLD-MCNC: 16.5 G/DL (ref 13–17.7)
MCH RBC QN AUTO: 32 PG (ref 26.6–33)
MCHC RBC AUTO-ENTMCNC: 34.2 G/DL (ref 31.5–35.7)
MCV RBC AUTO: 93.6 FL (ref 79–97)
PLATELET # BLD AUTO: 174 10*3/MM3 (ref 140–450)
PMV BLD AUTO: 11.1 FL (ref 6–12)
POTASSIUM BLD-SCNC: 4.3 MMOL/L (ref 3.5–5.2)
RBC # BLD AUTO: 5.15 10*6/MM3 (ref 4.14–5.8)
SODIUM BLD-SCNC: 138 MMOL/L (ref 136–145)
WBC NRBC COR # BLD: 5.49 10*3/MM3 (ref 3.4–10.8)

## 2019-05-07 PROCEDURE — 93000 ELECTROCARDIOGRAM COMPLETE: CPT | Performed by: INTERNAL MEDICINE

## 2019-05-07 PROCEDURE — 36415 COLL VENOUS BLD VENIPUNCTURE: CPT

## 2019-05-07 PROCEDURE — 80048 BASIC METABOLIC PNL TOTAL CA: CPT | Performed by: PHYSICIAN ASSISTANT

## 2019-05-07 PROCEDURE — 99204 OFFICE O/P NEW MOD 45 MIN: CPT | Performed by: INTERNAL MEDICINE

## 2019-05-07 PROCEDURE — 85027 COMPLETE CBC AUTOMATED: CPT | Performed by: PHYSICIAN ASSISTANT

## 2019-05-07 RX ORDER — ASPIRIN 81 MG/1
81 TABLET ORAL DAILY
Qty: 100 TABLET | Refills: 4
Start: 2019-05-07 | End: 2022-05-25 | Stop reason: ALTCHOICE

## 2019-05-07 RX ORDER — ACETAMINOPHEN 325 MG/1
650 TABLET ORAL EVERY 4 HOURS PRN
Status: CANCELLED | OUTPATIENT
Start: 2019-05-07

## 2019-05-07 RX ORDER — NITROGLYCERIN 0.4 MG/1
0.4 TABLET SUBLINGUAL
Status: CANCELLED | OUTPATIENT
Start: 2019-05-07

## 2019-05-07 RX ORDER — CLOPIDOGREL BISULFATE 75 MG/1
75 TABLET ORAL DAILY
Status: CANCELLED | OUTPATIENT
Start: 2019-05-08

## 2019-05-07 RX ORDER — BENZONATATE 100 MG/1
100 CAPSULE ORAL AS NEEDED
COMMUNITY
End: 2019-05-07

## 2019-05-07 RX ORDER — CYCLOSPORINE 0.5 MG/ML
1 EMULSION OPHTHALMIC EVERY 12 HOURS
COMMUNITY
Start: 2019-01-03

## 2019-05-07 RX ORDER — CARISOPRODOL 350 MG/1
350 TABLET ORAL AS NEEDED
COMMUNITY
End: 2022-05-25 | Stop reason: ALTCHOICE

## 2019-05-07 RX ORDER — CLOPIDOGREL BISULFATE 75 MG/1
600 TABLET ORAL ONCE
Status: CANCELLED | OUTPATIENT
Start: 2019-05-07 | End: 2019-05-07

## 2019-05-07 NOTE — PROGRESS NOTES
[facility]  INITIAL OFFICE CONSULT    Emmanuel Leahy  : 1955  MRN:[id number]  Patient Address: [address] [place] [address]   [place] [address]    Date of Encounter: 2019    PCP: Emigdio Iglesias MD  [id number] [facility] [id number]  Referring MD: Dr. Iglesias     IDENTIFICATION: A 64 y.o. male resident of [place], [place]     Chief Complaint   Patient presents with   • Chest Pain     PROBLEM LIST:   1. Chest pain   a. Normal MPS 2015  b. Normal stress MPS 2016   c. Recurrent chest pain and OTOOLE spring 2019  2. ?Remote brief PAF vesus artifact, inactive    a. Noted during stress MPS 2015, asymptomatic   b. EKG’s reviewed with Dr. Hearn: PAC’s vs AF  3. BPH  4. GERD   5. Hiatal hernia  6. Family history of CAD   7. Obesity, BMI 35     ALLERGIES:   Allergies   Allergen Reactions   • Sulfa Antibiotics Other (See Comments)     CURRENT MEDICATIONS:   •  amoxicillin-clavulanate (AUGMENTIN) 500-125 MG per tablet, Take 1 tablet by mouth As Needed.  •  benzonatate (TESSALON) 100 MG capsule, Take 100 mg by mouth As Needed for Cough  •  carisoprodol (SOMA) 350 MG tablet, Take 350 mg by mouth As Needed for Muscle Spasms  •  Dutasteride-Tamsulosin HCl (STEWART PO), Take  by mouth daily.  •  Ergocalciferol (VITAMIN D2 PO), Take  by mouth 1 (one) time per week.  •  HydrOXYzine HCl (ATARAX PO), Take 25 mg by mouth As Needed.  •  RABEprazole (ACIPHEX) 20 MG EC tablet, Take 20 mg by mouth 2 (Two) Times a Day  •  traMADol (ULTRAM) 50 MG tablet, Take  by mouth As Needed  •  VOLTAREN 1 % gel gel, As Needed    HPI: Mr. Leahy is a pleasant obese 63 y/o WM with above noted medical history who is seen in consultation today for recent onset chest pain. He reports midsternal chest pressure and burning sensation for the past month, that occurs at rest or with exertion, and usually lasts anywhere from 30 minutes to an hour. This radiates at times to his precordial area, and he has  "associated dyspnea, however denies nausea, diaphoresis or vomiting. He does report exertional dyspnea that has been present for the past few months which he initially noted while hunting. The chest pain wakes him up from sleep  and is sometimes relieved by Mylanta and other times it is not. He had an episode last night which \"scared him.\"  Previous cardiac work-up includes 2 normal stress MPS, last in 2016, as well as a remote normal heart catheterization in [facility] >25 years ago. He has a history of hiatal hernia with GERD and has undergone a recent EGD and colonoscopy, however has not heard back about these results yet. Denies tobacco, alcohol or drug use. Family history is significant for CAD in his father who  from an MI at age 65, as well as a brother with CAD in his early 60's. Of note, patient's wife has recently had a STEMI and he is taking care of her at home currently.     Cardiac Risk Factors include: advanced age (older than 55 for men, 65 for women), dyslipidemia, male gender and obesity (BMI >= 30 kg/m2)    ROS: All systems have been reviewed and are negative with the exception of those mentioned in the HPI and problem list above.    Surgical History:   Past Surgical History:   Procedure Laterality Date   • CARDIAC CATHETERIZATION     • CARPAL TUNNEL RELEASE Bilateral    • CHOLECYSTECTOMY     • KNEE SURGERY Left      Social History:   Social History     Socioeconomic History   • Marital status:    Tobacco Use   • Smoking status: Never Smoker   • Tobacco comment: history of smokeless tobacco use.   Substance and Sexual Activity   • Alcohol use: No   • Drug use: No   • Sexual activity: Defer     Family History:   Family History   Problem Relation Age of Onset   • Heart attack Father    • Heart disease Father    • Hypertension Brother    • No Known Problems Mother    • Hypertension Sister    • Heart attack Other    • Heart disease Other    • Heart failure Other    • Hypertension Other    • Diabetes " "Other        Objective     Vitals:    05/07/19 1016 05/07/19 1024 05/07/19 1025   BP: 120/80 119/81 111/76   BP Location: Right arm Left arm Left arm   Patient Position: Sitting Sitting Standing   Pulse: 60 62 65   Weight: 103 kg (227 lb) 103 kg (227 lb) 103 kg (227 lb)   Height: 172.7 cm (68\") 172.7 cm (68\") 172.7 cm (68\")     Body mass index is 34.52 kg/m².    PHYSICAL EXAM:  CONSTITUTIONAL: Well nourished, cooperative, in no acute distress  HEENT: Normocephalic, atraumatic, PERRLA, no JVD, no carotid bruit  CARDIOVASCULAR:  Regular rhythm and normal rate, no murmur, gallop, rub. Peripheral pulses are present.  RESPIRATORY: Clear to auscultation, normal respiratory effort, no wheezing, rales or ronchi  GI: Soft, nontender, normal bowel sounds  MUSCULOSKELETAL: No gross deformities, no edema  SKIN: Warm, dry. No bleeding, bruising or rash  NEUROLOGICAL: No focal deficits  PSYCHIATRIC: Normal mood and affect. Behavior is normal     Labs/Diagnostic Data  Lab Results   Component Value Date    WBC 6.02 04/05/2019    HGB 15.8 04/05/2019    HCT 49.2 04/05/2019    MCV 94.6 04/05/2019     04/05/2019     Lab Results   Component Value Date    GLUCOSE 87 04/05/2019    BUN 11 04/05/2019    CREATININE 1.04 04/05/2019    EGFRIFNONA 72 04/05/2019    BCR 10.6 04/05/2019    K 4.7 04/05/2019    CO2 26.7 04/05/2019    CALCIUM 9.2 04/05/2019    ALBUMIN 4.10 04/05/2019    LABIL2 1.5 07/11/2015    AST 19 04/05/2019    ALT 19 04/05/2019     Lab Results   Component Value Date    CHOL 174 04/05/2019    CHLPL 178 07/11/2015    TRIG 65 04/05/2019    HDL 44 04/05/2019     (H) 04/05/2019     Lab Results   Component Value Date    TSH 1.840 04/05/2019       ECG 12 Lead  Date/Time: 5/7/2019 11:09 AM  Performed by: Yen Bland PA-C  Authorized by: Yen Bland PA-C   Rhythm: sinus rhythm  Rate: normal  BPM: 64  QRS axis: normal  Other findings: low voltage  Comments: Low limb lead voltage           Radiology " Data:   CXR 4/5/19: No acute cardiopulmonary findings     Assessment and Plan:     1. Chest pain: mixed feature chest pain with 2 negative stress tests in the past. His current chest pain is worse than prior episodes, and due to his risk factors including male gender, obesity, dyslipidemia, and positive family history as well as his concern over his wife's recent STEMI, we will proceed with cardiac catheterization for definitive evaluation.   2. He is to begin a daily ECASA 81mg today.   3. Final disposition to follow pending cath results.     Thank you for allowing me to participate in the care of Emmanuel Leahy. Feel free to contact me directly with any further questions or concerns.    Scribed for Phan Jaimes MD by Yen Bland PA-C. 5/7/2019  11:04 AM   I, Phan Jaimes MD, FAC, [facility], personally performed the services described in this documentation as scribed by the above named individual in my presence, and it is both accurate and complete. At 11:38 AM on 05/07/2019

## 2019-05-07 NOTE — H&P (VIEW-ONLY)
[facility] at [facility]  INITIAL OFFICE CONSULT    Emmanuel Leahy  : 1955  MRN:[id number]  Patient Address: [address] [place] [address]   [place] [address]    Date of Encounter: 2019    PCP: Emigdio Iglesias MD  [id number] [facility] [id number]  Referring MD: Dr. Iglesias     IDENTIFICATION: A 64 y.o. male resident of [place], [place]     Chief Complaint   Patient presents with   • Chest Pain     PROBLEM LIST:   1. Chest pain   a. Normal MPS 2015  b. Normal stress MPS 2016   c. Recurrent chest pain and OTOOLE spring 2019  2. ?Remote brief PAF vesus artifact, inactive    a. Noted during stress MPS 2015, asymptomatic   b. EKG’s reviewed with Dr. Hearn: PAC’s vs AF  3. BPH  4. GERD   5. Hiatal hernia  6. Family history of CAD   7. Obesity, BMI 35     ALLERGIES:   Allergies   Allergen Reactions   • Sulfa Antibiotics Other (See Comments)     CURRENT MEDICATIONS:   •  amoxicillin-clavulanate (AUGMENTIN) 500-125 MG per tablet, Take 1 tablet by mouth As Needed.  •  benzonatate (TESSALON) 100 MG capsule, Take 100 mg by mouth As Needed for Cough  •  carisoprodol (SOMA) 350 MG tablet, Take 350 mg by mouth As Needed for Muscle Spasms  •  Dutasteride-Tamsulosin HCl (STEWART PO), Take  by mouth daily.  •  Ergocalciferol (VITAMIN D2 PO), Take  by mouth 1 (one) time per week.  •  HydrOXYzine HCl (ATARAX PO), Take 25 mg by mouth As Needed.  •  RABEprazole (ACIPHEX) 20 MG EC tablet, Take 20 mg by mouth 2 (Two) Times a Day  •  traMADol (ULTRAM) 50 MG tablet, Take  by mouth As Needed  •  VOLTAREN 1 % gel gel, As Needed    HPI: Mr. Leahy is a pleasant obese 65 y/o WM with above noted medical history who is seen in consultation today for recent onset chest pain. He reports midsternal chest pressure and burning sensation for the past month, that occurs at rest or with exertion, and usually lasts anywhere from 30 minutes to an hour. This radiates at times to his precordial area, and he has  "associated dyspnea, however denies nausea, diaphoresis or vomiting. He does report exertional dyspnea that has been present for the past few months which he initially noted while hunting. The chest pain wakes him up from sleep  and is sometimes relieved by Mylanta and other times it is not. He had an episode last night which \"scared him.\"  Previous cardiac work-up includes 2 normal stress MPS, last in 2016, as well as a remote normal heart catheterization in [facility] >25 years ago. He has a history of hiatal hernia with GERD and has undergone a recent EGD and colonoscopy, however has not heard back about these results yet. Denies tobacco, alcohol or drug use. Family history is significant for CAD in his father who  from an MI at age 65, as well as a brother with CAD in his early 60's. Of note, patient's wife has recently had a STEMI and he is taking care of her at home currently.     Cardiac Risk Factors include: advanced age (older than 55 for men, 65 for women), dyslipidemia, male gender and obesity (BMI >= 30 kg/m2)    ROS: All systems have been reviewed and are negative with the exception of those mentioned in the HPI and problem list above.    Surgical History:   Past Surgical History:   Procedure Laterality Date   • CARDIAC CATHETERIZATION     • CARPAL TUNNEL RELEASE Bilateral    • CHOLECYSTECTOMY     • KNEE SURGERY Left      Social History:   Social History     Socioeconomic History   • Marital status:    Tobacco Use   • Smoking status: Never Smoker   • Tobacco comment: history of smokeless tobacco use.   Substance and Sexual Activity   • Alcohol use: No   • Drug use: No   • Sexual activity: Defer     Family History:   Family History   Problem Relation Age of Onset   • Heart attack Father    • Heart disease Father    • Hypertension Brother    • No Known Problems Mother    • Hypertension Sister    • Heart attack Other    • Heart disease Other    • Heart failure Other    • Hypertension Other    • Diabetes " "Other        Objective     Vitals:    05/07/19 1016 05/07/19 1024 05/07/19 1025   BP: 120/80 119/81 111/76   BP Location: Right arm Left arm Left arm   Patient Position: Sitting Sitting Standing   Pulse: 60 62 65   Weight: 103 kg (227 lb) 103 kg (227 lb) 103 kg (227 lb)   Height: 172.7 cm (68\") 172.7 cm (68\") 172.7 cm (68\")     Body mass index is 34.52 kg/m².    PHYSICAL EXAM:  CONSTITUTIONAL: Well nourished, cooperative, in no acute distress  HEENT: Normocephalic, atraumatic, PERRLA, no JVD, no carotid bruit  CARDIOVASCULAR:  Regular rhythm and normal rate, no murmur, gallop, rub. Peripheral pulses are present.  RESPIRATORY: Clear to auscultation, normal respiratory effort, no wheezing, rales or ronchi  GI: Soft, nontender, normal bowel sounds  MUSCULOSKELETAL: No gross deformities, no edema  SKIN: Warm, dry. No bleeding, bruising or rash  NEUROLOGICAL: No focal deficits  PSYCHIATRIC: Normal mood and affect. Behavior is normal     Labs/Diagnostic Data  Lab Results   Component Value Date    WBC 6.02 04/05/2019    HGB 15.8 04/05/2019    HCT 49.2 04/05/2019    MCV 94.6 04/05/2019     04/05/2019     Lab Results   Component Value Date    GLUCOSE 87 04/05/2019    BUN 11 04/05/2019    CREATININE 1.04 04/05/2019    EGFRIFNONA 72 04/05/2019    BCR 10.6 04/05/2019    K 4.7 04/05/2019    CO2 26.7 04/05/2019    CALCIUM 9.2 04/05/2019    ALBUMIN 4.10 04/05/2019    LABIL2 1.5 07/11/2015    AST 19 04/05/2019    ALT 19 04/05/2019     Lab Results   Component Value Date    CHOL 174 04/05/2019    CHLPL 178 07/11/2015    TRIG 65 04/05/2019    HDL 44 04/05/2019     (H) 04/05/2019     Lab Results   Component Value Date    TSH 1.840 04/05/2019       ECG 12 Lead  Date/Time: 5/7/2019 11:09 AM  Performed by: Yen Bland PA-C  Authorized by: Yen Bland PA-C   Rhythm: sinus rhythm  Rate: normal  BPM: 64  QRS axis: normal  Other findings: low voltage  Comments: Low limb lead voltage           Radiology " Data:   CXR 4/5/19: No acute cardiopulmonary findings     Assessment and Plan:     1. Chest pain: mixed feature chest pain with 2 negative stress tests in the past. His current chest pain is worse than prior episodes, and due to his risk factors including male gender, obesity, dyslipidemia, and positive family history as well as his concern over his wife's recent STEMI, we will proceed with cardiac catheterization for definitive evaluation.   2. He is to begin a daily ECASA 81mg today.   3. Final disposition to follow pending cath results.     Thank you for allowing me to participate in the care of Emmanuel Leahy. Feel free to contact me directly with any further questions or concerns.    Scribed for Phan Jaimes MD by Yen Bland PA-C. 5/7/2019  11:04 AM   I, Phan Jaimes MD, FAC, [facility], personally performed the services described in this documentation as scribed by the above named individual in my presence, and it is both accurate and complete. At 11:38 AM on 05/07/2019

## 2019-05-07 NOTE — DISCHARGE INSTRUCTIONS

## 2019-05-10 ENCOUNTER — HOSPITAL ENCOUNTER (OUTPATIENT)
Facility: HOSPITAL | Age: 64
Discharge: HOME | End: 2019-05-10
Attending: INTERNAL MEDICINE | Admitting: PHYSICIAN ASSISTANT

## 2019-05-10 VITALS
DIASTOLIC BLOOD PRESSURE: 68 MMHG | HEIGHT: 68 IN | TEMPERATURE: 97 F | WEIGHT: 226.19 LBS | RESPIRATION RATE: 16 BRPM | HEART RATE: 55 BPM | SYSTOLIC BLOOD PRESSURE: 118 MMHG | BODY MASS INDEX: 34.28 KG/M2 | OXYGEN SATURATION: 90 %

## 2019-05-10 DIAGNOSIS — E66.9 OBESITY (BMI 30.0-34.9): ICD-10-CM

## 2019-05-10 DIAGNOSIS — E78.5 DYSLIPIDEMIA: ICD-10-CM

## 2019-05-10 DIAGNOSIS — R07.9 CHEST PAIN, UNSPECIFIED TYPE: ICD-10-CM

## 2019-05-10 PROCEDURE — 0 IOPAMIDOL PER 1 ML: Performed by: INTERNAL MEDICINE

## 2019-05-10 PROCEDURE — 25010000002 FENTANYL CITRATE (PF) 100 MCG/2ML SOLUTION: Performed by: INTERNAL MEDICINE

## 2019-05-10 PROCEDURE — C1760 CLOSURE DEV, VASC: HCPCS | Performed by: INTERNAL MEDICINE

## 2019-05-10 PROCEDURE — C1769 GUIDE WIRE: HCPCS | Performed by: INTERNAL MEDICINE

## 2019-05-10 PROCEDURE — 93458 L HRT ARTERY/VENTRICLE ANGIO: CPT | Performed by: INTERNAL MEDICINE

## 2019-05-10 PROCEDURE — 25010000002 MIDAZOLAM PER 1 MG: Performed by: INTERNAL MEDICINE

## 2019-05-10 PROCEDURE — C1894 INTRO/SHEATH, NON-LASER: HCPCS | Performed by: INTERNAL MEDICINE

## 2019-05-10 RX ORDER — NITROGLYCERIN 0.4 MG/1
0.4 TABLET SUBLINGUAL
Status: DISCONTINUED | OUTPATIENT
Start: 2019-05-10 | End: 2019-05-10 | Stop reason: HOSPADM

## 2019-05-10 RX ORDER — CLOPIDOGREL BISULFATE 75 MG/1
75 TABLET ORAL DAILY
Status: DISCONTINUED | OUTPATIENT
Start: 2019-05-11 | End: 2019-05-10 | Stop reason: HOSPADM

## 2019-05-10 RX ORDER — ACETAMINOPHEN 325 MG/1
650 TABLET ORAL EVERY 4 HOURS PRN
Status: DISCONTINUED | OUTPATIENT
Start: 2019-05-10 | End: 2019-05-10 | Stop reason: HOSPADM

## 2019-05-10 RX ORDER — MIDAZOLAM HYDROCHLORIDE 1 MG/ML
INJECTION INTRAMUSCULAR; INTRAVENOUS AS NEEDED
Status: DISCONTINUED | OUTPATIENT
Start: 2019-05-10 | End: 2019-05-10 | Stop reason: HOSPADM

## 2019-05-10 RX ORDER — FENTANYL CITRATE 50 UG/ML
INJECTION, SOLUTION INTRAMUSCULAR; INTRAVENOUS AS NEEDED
Status: DISCONTINUED | OUTPATIENT
Start: 2019-05-10 | End: 2019-05-10 | Stop reason: HOSPADM

## 2019-05-10 RX ORDER — CLOPIDOGREL BISULFATE 75 MG/1
600 TABLET ORAL ONCE
Status: COMPLETED | OUTPATIENT
Start: 2019-05-10 | End: 2019-05-10

## 2019-05-10 RX ORDER — SODIUM CHLORIDE 9 MG/ML
3 INJECTION, SOLUTION INTRAVENOUS CONTINUOUS
Status: ACTIVE | OUTPATIENT
Start: 2019-05-10 | End: 2019-05-10

## 2019-05-10 RX ORDER — LIDOCAINE HYDROCHLORIDE 10 MG/ML
INJECTION, SOLUTION EPIDURAL; INFILTRATION; INTRACAUDAL; PERINEURAL AS NEEDED
Status: DISCONTINUED | OUTPATIENT
Start: 2019-05-10 | End: 2019-05-10 | Stop reason: HOSPADM

## 2019-05-10 RX ADMIN — CLOPIDOGREL BISULFATE 600 MG: 75 TABLET ORAL at 07:35

## 2019-05-10 RX ADMIN — SODIUM CHLORIDE 3 ML/KG/HR: 9 INJECTION, SOLUTION INTRAVENOUS at 07:14

## 2019-05-10 NOTE — INTERVAL H&P NOTE
"  H&P reviewed. The patient was examined and there are no changes to the H&P.       Vitals and Labs today:  Vitals:    05/10/19 0600 05/10/19 0651 05/10/19 0652   BP:  148/92 (!) 140/103   BP Location:  Left arm Right arm   Patient Position:  Lying Lying   Pulse:   74   Resp:   18   Temp:   97 °F (36.1 °C)   TempSrc:   Temporal   SpO2:   95%   Weight: 103 kg (226 lb 3.1 oz)     Height: 172.7 cm (68\")       Lab Results   Component Value Date    WBC 5.49 05/07/2019    HGB 16.5 05/07/2019    HCT 48.2 05/07/2019    MCV 93.6 05/07/2019     05/07/2019     Lab Results   Component Value Date    GLUCOSE 89 05/07/2019    BUN 11 05/07/2019    CREATININE 0.85 05/07/2019    EGFRIFNONA 91 05/07/2019    BCR 12.9 05/07/2019    K 4.3 05/07/2019    CO2 24.0 05/07/2019    CALCIUM 9.4 05/07/2019    ALBUMIN 4.10 04/05/2019    LABIL2 1.5 07/11/2015    AST 19 04/05/2019    ALT 19 04/05/2019     Lab Results   Component Value Date    CHOL 174 04/05/2019    CHLPL 178 07/11/2015    TRIG 65 04/05/2019    HDL 44 04/05/2019     (H) 04/05/2019       Assessment:  · 65 y/o WM with history of HLD, obesity and positive family history of CAD with recent symptoms of chest pain. He has had two prior negative noninvasive ischemic evaluations and presents today for cardiac cath for definitive evaluation.    Plan:  · LHC +/- CBI. Risks, benefits and alternatives to the procedure explained to the patient and he understands and wishes to proceed.     I, Phan Jaimes MD, personally performed the services described as documented by the above named individual. I have made any necessary edits and it is both accurate and complete 5/10/2019  10:52 AM    "

## 2019-05-13 ENCOUNTER — DOCUMENTATION (OUTPATIENT)
Dept: CARDIAC REHAB | Facility: HOSPITAL | Age: 64
End: 2019-05-13

## 2019-08-22 ENCOUNTER — HOSPITAL ENCOUNTER (OUTPATIENT)
Dept: MAMMOGRAPHY | Facility: HOSPITAL | Age: 64
Discharge: HOME | End: 2019-08-22
Admitting: INTERNAL MEDICINE

## 2019-08-22 ENCOUNTER — HOSPITAL ENCOUNTER (OUTPATIENT)
Dept: ULTRASOUND IMAGING | Facility: HOSPITAL | Age: 64
Discharge: HOME | End: 2019-08-22

## 2019-08-22 DIAGNOSIS — N63.0 LUMP OR MASS IN BREAST: ICD-10-CM

## 2019-08-22 DIAGNOSIS — N63.0 BREAST MASS: ICD-10-CM

## 2019-08-22 PROCEDURE — 77062 BREAST TOMOSYNTHESIS BI: CPT | Performed by: RADIOLOGY

## 2019-08-22 PROCEDURE — 76642 ULTRASOUND BREAST LIMITED: CPT | Performed by: RADIOLOGY

## 2019-08-22 PROCEDURE — 76642 ULTRASOUND BREAST LIMITED: CPT

## 2019-08-22 PROCEDURE — 77066 DX MAMMO INCL CAD BI: CPT

## 2019-08-22 PROCEDURE — G0279 TOMOSYNTHESIS, MAMMO: HCPCS

## 2019-08-22 PROCEDURE — 77066 DX MAMMO INCL CAD BI: CPT | Performed by: RADIOLOGY

## 2021-01-20 ENCOUNTER — IMMUNIZATION (OUTPATIENT)
Dept: VACCINE CLINIC | Facility: HOSPITAL | Age: 66
End: 2021-01-20

## 2021-01-20 PROCEDURE — 0001A: CPT | Performed by: FAMILY MEDICINE

## 2021-01-20 PROCEDURE — 91300 HC SARSCOV02 VAC 30MCG/0.3ML IM: CPT | Performed by: FAMILY MEDICINE

## 2021-02-10 ENCOUNTER — IMMUNIZATION (OUTPATIENT)
Dept: VACCINE CLINIC | Facility: HOSPITAL | Age: 66
End: 2021-02-10

## 2021-02-10 PROCEDURE — 91300 HC SARSCOV02 VAC 30MCG/0.3ML IM: CPT | Performed by: INTERNAL MEDICINE

## 2021-02-10 PROCEDURE — 0002A: CPT | Performed by: INTERNAL MEDICINE

## 2021-09-30 ENCOUNTER — IMMUNIZATION (OUTPATIENT)
Dept: VACCINE CLINIC | Facility: HOSPITAL | Age: 66
End: 2021-09-30

## 2021-09-30 PROCEDURE — 91300 HC SARSCOV02 VAC 30MCG/0.3ML IM: CPT | Performed by: INTERNAL MEDICINE

## 2021-09-30 PROCEDURE — 0003A: CPT | Performed by: INTERNAL MEDICINE

## 2022-05-12 ENCOUNTER — HOSPITAL ENCOUNTER (OUTPATIENT)
Dept: GENERAL RADIOLOGY | Facility: HOSPITAL | Age: 67
Discharge: HOME | End: 2022-05-12

## 2022-05-12 ENCOUNTER — TRANSCRIBE ORDERS (OUTPATIENT)
Dept: ADMINISTRATIVE | Facility: HOSPITAL | Age: 67
End: 2022-05-12

## 2022-05-12 DIAGNOSIS — M25.552 LEFT HIP PAIN: ICD-10-CM

## 2022-05-12 DIAGNOSIS — M25.562 LEFT KNEE PAIN, UNSPECIFIED CHRONICITY: ICD-10-CM

## 2022-05-12 DIAGNOSIS — M25.562 LEFT KNEE PAIN, UNSPECIFIED CHRONICITY: Primary | ICD-10-CM

## 2022-05-12 PROCEDURE — 73562 X-RAY EXAM OF KNEE 3: CPT

## 2022-05-12 PROCEDURE — 73562 X-RAY EXAM OF KNEE 3: CPT | Performed by: RADIOLOGY

## 2022-05-12 PROCEDURE — 73502 X-RAY EXAM HIP UNI 2-3 VIEWS: CPT | Performed by: RADIOLOGY

## 2022-05-12 PROCEDURE — 73502 X-RAY EXAM HIP UNI 2-3 VIEWS: CPT

## 2022-05-25 ENCOUNTER — OFFICE VISIT (OUTPATIENT)
Dept: ORTHOPEDIC SURGERY | Facility: CLINIC | Age: 67
End: 2022-05-25

## 2022-05-25 VITALS
WEIGHT: 216 LBS | SYSTOLIC BLOOD PRESSURE: 120 MMHG | HEART RATE: 63 BPM | BODY MASS INDEX: 32.74 KG/M2 | HEIGHT: 68 IN | DIASTOLIC BLOOD PRESSURE: 76 MMHG

## 2022-05-25 DIAGNOSIS — M16.12 PRIMARY OSTEOARTHRITIS OF LEFT HIP: Primary | ICD-10-CM

## 2022-05-25 PROCEDURE — 99203 OFFICE O/P NEW LOW 30 MIN: CPT | Performed by: PHYSICIAN ASSISTANT

## 2022-05-25 RX ORDER — OXYBUTYNIN CHLORIDE 10 MG/1
10 TABLET, EXTENDED RELEASE ORAL DAILY
COMMUNITY

## 2022-05-25 RX ORDER — IBUPROFEN 800 MG/1
800 TABLET ORAL EVERY 6 HOURS PRN
COMMUNITY

## 2022-05-25 RX ORDER — ERGOCALCIFEROL 1.25 MG/1
50000 CAPSULE ORAL WEEKLY
COMMUNITY

## 2022-05-25 NOTE — PROGRESS NOTES
[facility] New Patient Visit          Patient: Emmanuel Leahy Jr.  YOB: 1955  Date of Encounter: 05/25/2022  PCP: Emigdio Iglesias MD      Subjective     Chief Complaint   Patient presents with   • Left Hip - Initial Evaluation, Pain           History of Present Illness:     Emmanuel Leahy Jr. is a 67 y.o. male presents today 8 to 10-day history of left hip pain anterior groin pain radiating down to the mid to lower third thigh.  The patient complains of this pain with no injury with recent worsening.  He has longstanding lower lumbar pain and states this feels different.  Patient has difficulty on range of motion specifically rotation of the hip with a catching sensation.  He has been implementing anti-inflammatory medication with no benefit.  He describes dull throbbing aching sensation at rest and difficulty lying affected side.        Patient Active Problem List   Diagnosis   • Precordial pain   • HLD (hyperlipidemia)   • Arthritis   • Paroxysmal atrial fibrillation (HCC)   • BPH (benign prostatic hyperplasia)   • GERD (gastroesophageal reflux disease)   • Chest pain   • Obesity (BMI 30.0-34.9)     Past Medical History:   Diagnosis Date   • Arthritis     back, knee    • Atrial fibrillation (HCC)     noted during stress test 7/2015. Spont. converted to SR.   • BPH (benign prostatic hyperplasia)    • Chest pain 04/2019    started about 4 weeks ago   • Dry eyes    • GERD (gastroesophageal reflux disease)    • History of migraine    • Measles    • Mumps      Past Surgical History:   Procedure Laterality Date   • CARDIAC CATHETERIZATION     • CARDIAC CATHETERIZATION N/A 5/10/2019    Procedure: Left Heart Cath;  Surgeon: Phan Jaimes MD;  Location: [facility] INVASIVE LOCATION;  Service: Cardiology   • CARPAL TUNNEL RELEASE Bilateral    • CHOLECYSTECTOMY     • COLONOSCOPY     • ENDOSCOPY     • KNEE ARTHROPLASTY, PARTIAL REPLACEMENT Left      Social History      Occupational History   • Not on file   Tobacco Use   • Smoking status: Never Smoker   • Smokeless tobacco: Former User     Types: Chew     Quit date: 5/7/1984   Vaping Use   • Vaping Use: Never used   Substance and Sexual Activity   • Alcohol use: No   • Drug use: No   • Sexual activity: Defer    Emmanuel Leahy Jr.  reports that he has never smoked. He quit smokeless tobacco use about 38 years ago.  His smokeless tobacco use included chew.. I have educated him on the risk of diseases from using tobacco products such as cancer, COPD and heart disease.          Social History     Social History Narrative   • Not on file     Family History   Problem Relation Age of Onset   • No Known Problems Mother    • Heart attack Father    • Heart disease Father    • Hypertension Sister    • Hypertension Brother    • Heart attack Other    • Heart disease Other    • Heart failure Other    • Hypertension Other    • Diabetes Other    • Arthritis Maternal Grandmother      Current Outpatient Medications   Medication Sig Dispense Refill   • cycloSPORINE (RESTASIS) 0.05 % ophthalmic emulsion Apply 1 drop to eye(s) as directed by provider Every 12 (Twelve) Hours.     • diclofenac (VOLTAREN) 3 % gel gel Apply  topically to the appropriate area as directed 2 (Two) Times a Day As Needed. for 60 days.     • dutasteride-tamsulosin (STEWART) 0.5-0.4 MG capsule capsule Take 1 dose by mouth Every Morning.     • HydrOXYzine HCl (ATARAX PO) Take 25 mg by mouth As Needed (itching).     • ibuprofen (ADVIL,MOTRIN) 800 MG tablet Take 800 mg by mouth Every 6 (Six) Hours As Needed for Mild Pain .     • oxybutynin XL (DITROPAN-XL) 10 MG 24 hr tablet Take 10 mg by mouth Daily.     • RABEprazole (ACIPHEX) 20 MG EC tablet Take 20 mg by mouth 2 (Two) Times a Day.     • vitamin D (ERGOCALCIFEROL) 1.25 MG (69094 UT) capsule capsule Take 50,000 Units by mouth 1 (One) Time Per Week.       No current facility-administered medications for this visit.  "    Allergies   Allergen Reactions   • Sulfa Antibiotics Other (See Comments)     Childhood reaction             Review of Systems   Constitutional: Negative.   HENT: Negative.    Eyes: Negative.    Cardiovascular: Positive for leg swelling.   Respiratory: Negative.    Endocrine: Negative.    Hematologic/Lymphatic: Bruises/bleeds easily.   Skin: Negative.    Musculoskeletal: Positive for back pain, joint pain and joint swelling.        Pertinent positives listed in HPI   Gastrointestinal: Negative.    Genitourinary: Positive for frequency.   Neurological: Negative.    Psychiatric/Behavioral: Negative.    Allergic/Immunologic: Negative.          Objective      Vitals:    05/25/22 1325   BP: 120/76   Pulse: 63   Weight: 98 kg (216 lb)   Height: 172.7 cm (68\")      BMI is >= 30 and <= 34.9 (Class 1 obesity). The following options were offered after discussion: exercise counseling/recommendations and nutrition counseling/recommendations      Physical Exam  Vitals and nursing note reviewed.   Constitutional:       General: He is not in acute distress.     Appearance: Normal appearance. He is not ill-appearing.   HENT:      Head: Normocephalic and atraumatic.      Right Ear: External ear normal.      Left Ear: External ear normal.      Nose: Nose normal.      Mouth/Throat:      Mouth: Mucous membranes are moist.      Pharynx: Oropharynx is clear.   Eyes:      Extraocular Movements: Extraocular movements intact.      Conjunctiva/sclera: Conjunctivae normal.      Pupils: Pupils are equal, round, and reactive to light.   Cardiovascular:      Rate and Rhythm: Normal rate.      Pulses: Normal pulses.   Pulmonary:      Effort: Pulmonary effort is normal.   Abdominal:      General: There is no distension.   Musculoskeletal:      Cervical back: Normal range of motion. No rigidity.      Lumbar back: No bony tenderness. Decreased range of motion. Negative right straight leg raise test and negative left straight leg raise test.      " Left hip: Tenderness, bony tenderness and crepitus present. Decreased range of motion (30 degree arc of rotation ). Normal strength.        Legs:    Skin:     General: Skin is warm and dry.      Capillary Refill: Capillary refill takes less than 2 seconds.   Neurological:      General: No focal deficit present.      Mental Status: He is alert and oriented to person, place, and time.      Cranial Nerves: Cranial nerves are intact.   Psychiatric:         Mood and Affect: Mood normal.         Behavior: Behavior normal.           Radiology:      XR Knee 3 View Left    Result Date: 5/12/2022    No acute findings in the left knee.  This report was finalized on 5/12/2022 11:03 AM by Dr. Nasir Collazo MD.      XR Hip With or Without Pelvis 2 - 3 View Left    Result Date: 5/12/2022    Advanced left hip degenerative change.  This report was finalized on 5/12/2022 11:02 AM by Dr. Nasir Collazo MD.              Assessment/Plan        ICD-10-CM ICD-9-CM   1. Primary osteoarthritis of left hip  M16.12 715.15       67-year-old male with moderate to advanced left hip osteoarthritis.  As a diagnostic and therapeutic approach the patient was provided today with an order for fluoroscopy guided left hip injection.  Patient return back in 3 weeks following the injection.  Discussion was had with patient and he does have longstanding lower lumbar radicular component symptoms and history with previous review of MRI lumbar spine.  We will evaluate his symptoms and treatment plan accordingly based on the efficacy of the fluoroscopy guided injection.                  This document was signed by Greg Gilbert PA-C May 25, 2022     CC: Emigdio Iglesias MD      Part of this note may be completed utilizing the dragon speech recognition software. This electronic transcription/translation of spoken language to printed text may contain Grammatical errors, random word insertions, pronoun errors, and incomplete sentences or occasional consequences  of the system due to software limitations, ambient noise, and hardware issues.  Any questions or concerns about the content, text, or information contained within the body of this dictation should be directly addressed to the physician for clarification.

## 2022-06-22 ENCOUNTER — HOSPITAL ENCOUNTER (OUTPATIENT)
Dept: GENERAL RADIOLOGY | Facility: HOSPITAL | Age: 67
Discharge: HOME | End: 2022-06-22
Admitting: PHYSICIAN ASSISTANT

## 2022-06-22 VITALS
BODY MASS INDEX: 33.04 KG/M2 | SYSTOLIC BLOOD PRESSURE: 134 MMHG | HEIGHT: 68 IN | OXYGEN SATURATION: 96 % | DIASTOLIC BLOOD PRESSURE: 86 MMHG | WEIGHT: 218 LBS | RESPIRATION RATE: 16 BRPM | HEART RATE: 55 BPM

## 2022-06-22 DIAGNOSIS — M16.12 PRIMARY OSTEOARTHRITIS OF LEFT HIP: ICD-10-CM

## 2022-06-22 PROCEDURE — 77002 NEEDLE LOCALIZATION BY XRAY: CPT

## 2022-06-22 PROCEDURE — 25010000002 TRIAMCINOLONE PER 10 MG: Performed by: RADIOLOGY

## 2022-06-22 PROCEDURE — 76000 FLUOROSCOPY <1 HR PHYS/QHP: CPT | Performed by: RADIOLOGY

## 2022-06-22 PROCEDURE — 25010000002 IOPAMIDOL 61 % SOLUTION: Performed by: PHYSICIAN ASSISTANT

## 2022-06-22 RX ORDER — TRIAMCINOLONE ACETONIDE 40 MG/ML
40 INJECTION, SUSPENSION INTRA-ARTICULAR; INTRAMUSCULAR ONCE
Status: COMPLETED | OUTPATIENT
Start: 2022-06-22 | End: 2022-06-22

## 2022-06-22 RX ORDER — LIDOCAINE HYDROCHLORIDE 20 MG/ML
2.5 INJECTION, SOLUTION EPIDURAL; INFILTRATION; INTRACAUDAL; PERINEURAL ONCE
Status: COMPLETED | OUTPATIENT
Start: 2022-06-22 | End: 2022-06-22

## 2022-06-22 RX ORDER — BUPIVACAINE HYDROCHLORIDE 5 MG/ML
5 INJECTION, SOLUTION PERINEURAL ONCE
Status: COMPLETED | OUTPATIENT
Start: 2022-06-22 | End: 2022-06-22

## 2022-06-22 RX ADMIN — BUPIVACAINE HYDROCHLORIDE 5 ML: 5 INJECTION, SOLUTION PERINEURAL at 12:59

## 2022-06-22 RX ADMIN — IOPAMIDOL 3 ML: 612 INJECTION, SOLUTION INTRAVENOUS at 13:00

## 2022-06-22 RX ADMIN — TRIAMCINOLONE ACETONIDE 40 MG: 40 INJECTION, SUSPENSION INTRA-ARTICULAR; INTRAMUSCULAR at 12:59

## 2022-06-22 RX ADMIN — LIDOCAINE HYDROCHLORIDE 2.5 ML: 20 INJECTION, SOLUTION EPIDURAL; INFILTRATION; INTRACAUDAL; PERINEURAL at 12:59

## 2022-07-13 ENCOUNTER — OFFICE VISIT (OUTPATIENT)
Dept: ORTHOPEDIC SURGERY | Facility: CLINIC | Age: 67
End: 2022-07-13

## 2022-07-13 VITALS — BODY MASS INDEX: 33.04 KG/M2 | HEIGHT: 68 IN | WEIGHT: 218.03 LBS

## 2022-07-13 DIAGNOSIS — M16.12 PRIMARY OSTEOARTHRITIS OF LEFT HIP: Primary | ICD-10-CM

## 2022-07-13 PROCEDURE — 99213 OFFICE O/P EST LOW 20 MIN: CPT | Performed by: PHYSICIAN ASSISTANT

## 2022-07-13 NOTE — PROGRESS NOTES
[facility] [facility] Surgery Established Patient Visit          Patient: Emmanuel Leahy Jr.  YOB: 1955  Date of Encounter: 7/13/2022  PCP: Emigdio Iglesias MD      Subjective     Chief Complaint   Patient presents with   • Left Hip - Pain, Follow-up           History of Present Illness:     Emmanuel Leahy Jr. is a 67 y.o. male presents today with notable left hip osteoarthritis 3 weeks removed from fluoroscopy-guided femoral acetabular joint injection.  Patient attributes near complete resolution of pain symptoms with improved overall range of motion and activity levels.  He reports no significant new complaints today.  Patient still has his longstanding lower lumbar pain however has had complete resolution of his anterior hip and groin pain.  He was to have a catching sensation into the anterior hip however this is nonpainful.  Denies any paresthesias.        Patient Active Problem List   Diagnosis   • Precordial pain   • HLD (hyperlipidemia)   • Arthritis   • Paroxysmal atrial fibrillation (HCC)   • BPH (benign prostatic hyperplasia)   • GERD (gastroesophageal reflux disease)   • Chest pain   • Obesity (BMI 30.0-34.9)     Past Medical History:   Diagnosis Date   • Arthritis     back, knee    • Atrial fibrillation (HCC)     noted during stress test 7/2015. Spont. converted to SR.   • BPH (benign prostatic hyperplasia)    • Chest pain 04/2019    started about 4 weeks ago   • Dry eyes    • GERD (gastroesophageal reflux disease)    • History of migraine    • Measles    • Mumps      Past Surgical History:   Procedure Laterality Date   • CARDIAC CATHETERIZATION     • CARDIAC CATHETERIZATION N/A 5/10/2019    Procedure: Left Heart Cath;  Surgeon: Phan Jaimes MD;  Location: [facility] INVASIVE LOCATION;  Service: Cardiology   • CARPAL TUNNEL RELEASE Bilateral    • CHOLECYSTECTOMY     • COLONOSCOPY     • ENDOSCOPY     • KNEE ARTHROPLASTY, PARTIAL REPLACEMENT Left      Social  History     Occupational History   • Not on file   Tobacco Use   • Smoking status: Never Smoker   • Smokeless tobacco: Former User     Types: Chew     Quit date: 5/7/1984   Vaping Use   • Vaping Use: Never used   Substance and Sexual Activity   • Alcohol use: No   • Drug use: No   • Sexual activity: Defer    Emmanuel Leahy Jr.  reports that he has never smoked. He quit smokeless tobacco use about 38 years ago.  His smokeless tobacco use included chew.. I have educated him on the risk of diseases from using tobacco products such as cancer, COPD and heart disease.          Social History     Social History Narrative   • Not on file     Family History   Problem Relation Age of Onset   • No Known Problems Mother    • Heart attack Father    • Heart disease Father    • Hypertension Sister    • Hypertension Brother    • Heart attack Other    • Heart disease Other    • Heart failure Other    • Hypertension Other    • Diabetes Other    • Arthritis Maternal Grandmother      Current Outpatient Medications   Medication Sig Dispense Refill   • cycloSPORINE (RESTASIS) 0.05 % ophthalmic emulsion Apply 1 drop to eye(s) as directed by provider Every 12 (Twelve) Hours.     • diclofenac (VOLTAREN) 3 % gel gel Apply  topically to the appropriate area as directed 2 (Two) Times a Day As Needed. for 60 days.     • dutasteride-tamsulosin (STEWART) 0.5-0.4 MG capsule capsule Take 1 dose by mouth Every Morning.     • HydrOXYzine HCl (ATARAX PO) Take 25 mg by mouth As Needed (itching).     • ibuprofen (ADVIL,MOTRIN) 800 MG tablet Take 800 mg by mouth Every 6 (Six) Hours As Needed for Mild Pain .     • oxybutynin XL (DITROPAN-XL) 10 MG 24 hr tablet Take 10 mg by mouth Daily.     • RABEprazole (ACIPHEX) 20 MG EC tablet Take 20 mg by mouth 2 (Two) Times a Day.     • vitamin D (ERGOCALCIFEROL) 1.25 MG (95277 UT) capsule capsule Take 50,000 Units by mouth 1 (One) Time Per Week.       No current facility-administered medications for this  "visit.     Allergies   Allergen Reactions   • Sulfa Antibiotics Other (See Comments)     Childhood reaction             Review of Systems   Constitutional: Negative.   HENT: Negative.    Eyes: Negative.    Cardiovascular: Positive for leg swelling.   Respiratory: Negative.    Endocrine: Negative.    Hematologic/Lymphatic: Bruises/bleeds easily.   Skin: Negative.    Musculoskeletal: Positive for back pain, joint pain and joint swelling.        Pertinent positives listed in HPI   Gastrointestinal: Negative.    Genitourinary: Positive for frequency.   Neurological: Negative.    Psychiatric/Behavioral: Negative.    Allergic/Immunologic: Negative.          Objective      Vitals:    07/13/22 1042   Weight: 98.9 kg (218 lb 0.6 oz)   Height: 172.7 cm (67.99\")      BMI is >= 30 and <= 34.9 (Class 1 obesity). The following options were offered after discussion: exercise counseling/recommendations and nutrition counseling/recommendations      Physical Exam  Vitals and nursing note reviewed.   Constitutional:       General: He is not in acute distress.     Appearance: Normal appearance. He is not ill-appearing.   HENT:      Head: Normocephalic and atraumatic.      Right Ear: External ear normal.      Left Ear: External ear normal.      Nose: Nose normal.      Mouth/Throat:      Mouth: Mucous membranes are moist.      Pharynx: Oropharynx is clear.   Eyes:      Extraocular Movements: Extraocular movements intact.      Conjunctiva/sclera: Conjunctivae normal.      Pupils: Pupils are equal, round, and reactive to light.   Cardiovascular:      Rate and Rhythm: Normal rate.      Pulses: Normal pulses.   Pulmonary:      Effort: Pulmonary effort is normal.   Abdominal:      General: There is no distension.   Musculoskeletal:      Cervical back: Normal range of motion. No rigidity.      Lumbar back: No bony tenderness. Decreased range of motion. Negative right straight leg raise test and negative left straight leg raise test.      Left " hip: No tenderness, bony tenderness or crepitus. Normal range of motion (30 degree arc of rotation ). Normal strength.        Legs:    Skin:     General: Skin is warm and dry.      Capillary Refill: Capillary refill takes less than 2 seconds.   Neurological:      General: No focal deficit present.      Mental Status: He is alert and oriented to person, place, and time.      Cranial Nerves: Cranial nerves are intact.   Psychiatric:         Mood and Affect: Mood normal.         Behavior: Behavior normal.           Radiology:      FL Guide For Pain Meds Inj    Result Date: 6/22/2022  Successful therapeutic injection of the left hip .  This report was finalized on 6/22/2022 2:19 PM by Dr. Nasir Collazo MD.               Assessment/Plan        ICD-10-CM ICD-9-CM   1. Primary osteoarthritis of left hip  M16.12 715.15         67-year-old male with moderate to advanced left hip osteoarthritis.  As a diagnostic and therapeutic approach the patient was provided fluoroscopy guided left hip injection to which he has had significant improvement.  He has had reduction of pain and improve overall range of motion and activity levels.  Patient was instructed to return back on as-needed basis for any further complication or worsening/return of pain.  Ultimately his goal is to exhaust all conservative treatment options before discussing total hip arthroplasty.                    This document was signed by Greg Gilbert PA-C July 13, 2022     CC: Emigdio Iglesias MD      Part of this note may be completed utilizing the dragon speech recognition software. This electronic transcription/translation of spoken language to printed text may contain Grammatical errors, random word insertions, pronoun errors, and incomplete sentences or occasional consequences of the system due to software limitations, ambient noise, and hardware issues.  Any questions or concerns about the content, text, or information contained within the body of this  dictation should be directly addressed to the physician for clarification.

## 2022-11-10 ENCOUNTER — TELEPHONE (OUTPATIENT)
Dept: ORTHOPEDICS | Facility: OTHER | Age: 67
End: 2022-11-10

## 2022-11-10 NOTE — TELEPHONE ENCOUNTER
Caller: CLEOPATRA PINEDO    Relationship to patient: SELF    Best call back number: [phone]    Chief complaint: LEFT HIP INJECTIONS    Type of visit: LEFT HIP INJECTIONS     Requested date: BEFORE December 17TH     If rescheduling, when is the original appointment: NA     Additional notes: NA

## 2022-11-11 ENCOUNTER — TELEPHONE (OUTPATIENT)
Dept: ORTHOPEDIC SURGERY | Facility: CLINIC | Age: 67
End: 2022-11-11

## 2022-11-11 NOTE — TELEPHONE ENCOUNTER
Provider: DAPHNE  Caller: JIM   Phone Number: [phone]  Reason for Call: PT IS CALLING TO SCHEDULE GUIDED INJECTIONS AT THE HOSPITAL.      FL Guide For Pain Meds Injection Joint      NEEDS BEFORE DEC 16

## 2022-11-21 ENCOUNTER — OFFICE VISIT (OUTPATIENT)
Dept: ORTHOPEDIC SURGERY | Facility: CLINIC | Age: 67
End: 2022-11-21

## 2022-11-21 VITALS — BODY MASS INDEX: 33.04 KG/M2 | WEIGHT: 218 LBS | HEIGHT: 68 IN

## 2022-11-21 DIAGNOSIS — M16.12 PRIMARY OSTEOARTHRITIS OF LEFT HIP: Primary | ICD-10-CM

## 2022-11-21 PROCEDURE — 99213 OFFICE O/P EST LOW 20 MIN: CPT | Performed by: PHYSICIAN ASSISTANT

## 2022-11-21 RX ORDER — PANTOPRAZOLE SODIUM 40 MG/1
TABLET, DELAYED RELEASE ORAL
COMMUNITY
Start: 2022-10-18

## 2022-11-21 RX ORDER — ESCITALOPRAM OXALATE 5 MG/1
TABLET ORAL
COMMUNITY
Start: 2022-09-28

## 2022-11-21 NOTE — PROGRESS NOTES
[facility] Established Patient Visit          Patient: Emmanuel Leahy Jr.  YOB: 1955  Date of Encounter: 11/21/2022  PCP: Emigdio Iglesias MD      Subjective     Chief Complaint   Patient presents with   • Left Hip - Follow-up, Pain           History of Present Illness:     Emmanuel Leahy Jr. is a 67 y.o. male presents today with notable left hip osteoarthritis greater than 5 months removed from fluoroscopy-guided femoral acetabular joint injection.  Patient attributes near complete resolution of pain symptoms with improved overall range of motion and activity levels until the last several weeks.  He attributes to return of his pain and symptoms not as severe as previous.  He has difficulty upon getting up from a seated position as well as prolonged sitting or standing.  Once again he has return of his anterior hip and groin pain.  He continues to have  a catching sensation into the anterior hip however this is nonpainful.  Denies any paresthesias.        Patient Active Problem List   Diagnosis   • Precordial pain   • HLD (hyperlipidemia)   • Arthritis   • Paroxysmal atrial fibrillation (HCC)   • BPH (benign prostatic hyperplasia)   • GERD (gastroesophageal reflux disease)   • Chest pain   • Obesity (BMI 30.0-34.9)     Past Medical History:   Diagnosis Date   • Arthritis     back, knee    • Atrial fibrillation (HCC)     noted during stress test 7/2015. Spont. converted to SR.   • BPH (benign prostatic hyperplasia)    • Chest pain 04/2019    started about 4 weeks ago   • Dry eyes    • GERD (gastroesophageal reflux disease)    • History of migraine    • Measles    • Mumps      Past Surgical History:   Procedure Laterality Date   • CARDIAC CATHETERIZATION     • CARDIAC CATHETERIZATION N/A 5/10/2019    Procedure: Left Heart Cath;  Surgeon: Phan Jaimes MD;  Location: [facility] INVASIVE LOCATION;  Service: Cardiology   • CARPAL TUNNEL RELEASE Bilateral    •  CHOLECYSTECTOMY     • COLONOSCOPY     • ENDOSCOPY     • KNEE ARTHROPLASTY, PARTIAL REPLACEMENT Left      Social History     Occupational History   • Not on file   Tobacco Use   • Smoking status: Never   • Smokeless tobacco: Former     Types: Chew     Quit date: 5/7/1984   Vaping Use   • Vaping Use: Never used   Substance and Sexual Activity   • Alcohol use: No   • Drug use: No   • Sexual activity: Defer    Emmanuel Leahy JrTomas  reports that he has never smoked. He quit smokeless tobacco use about 38 years ago.  His smokeless tobacco use included chew.. I have educated him on the risk of diseases from using tobacco products such as cancer, COPD and heart disease.          Social History     Social History Narrative   • Not on file     Family History   Problem Relation Age of Onset   • No Known Problems Mother    • Heart attack Father    • Heart disease Father    • Hypertension Sister    • Hypertension Brother    • Heart attack Other    • Heart disease Other    • Heart failure Other    • Hypertension Other    • Diabetes Other    • Arthritis Maternal Grandmother      Current Outpatient Medications   Medication Sig Dispense Refill   • diclofenac (VOLTAREN) 3 % gel gel Apply  topically to the appropriate area as directed 2 (Two) Times a Day As Needed. for 60 days.     • dutasteride-tamsulosin (STEWART) 0.5-0.4 MG capsule capsule Take 1 dose by mouth Every Morning.     • escitalopram (LEXAPRO) 5 MG tablet      • HydrOXYzine HCl (ATARAX PO) Take 25 mg by mouth As Needed (itching).     • ibuprofen (ADVIL,MOTRIN) 800 MG tablet Take 800 mg by mouth Every 6 (Six) Hours As Needed for Mild Pain .     • oxybutynin XL (DITROPAN-XL) 10 MG 24 hr tablet Take 10 mg by mouth Daily.     • pantoprazole (PROTONIX) 40 MG EC tablet      • vitamin D (ERGOCALCIFEROL) 1.25 MG (56463 UT) capsule capsule Take 50,000 Units by mouth 1 (One) Time Per Week.     • cycloSPORINE (RESTASIS) 0.05 % ophthalmic emulsion Apply 1 drop to eye(s) as  "directed by provider Every 12 (Twelve) Hours.     • RABEprazole (ACIPHEX) 20 MG EC tablet Take 20 mg by mouth 2 (Two) Times a Day.       No current facility-administered medications for this visit.     Allergies   Allergen Reactions   • Sulfa Antibiotics Other (See Comments)     Childhood reaction             Review of Systems   Constitutional: Negative.   HENT: Negative.    Eyes: Negative.    Cardiovascular: Positive for leg swelling.   Respiratory: Negative.    Endocrine: Negative.    Hematologic/Lymphatic: Bruises/bleeds easily.   Skin: Negative.    Musculoskeletal: Positive for back pain, joint pain and joint swelling.        Pertinent positives listed in HPI   Gastrointestinal: Negative.    Genitourinary: Positive for frequency.   Neurological: Negative.    Psychiatric/Behavioral: Negative.    Allergic/Immunologic: Negative.          Objective      Vitals:    11/21/22 1538   Weight: 98.9 kg (218 lb)   Height: 172.7 cm (68\")      BMI is >= 30 and <= 34.9 (Class 1 obesity). The following options were offered after discussion: exercise counseling/recommendations and nutrition counseling/recommendations      Physical Exam  Vitals and nursing note reviewed.   Constitutional:       General: He is not in acute distress.     Appearance: Normal appearance. He is not ill-appearing.   HENT:      Head: Normocephalic and atraumatic.      Right Ear: External ear normal.      Left Ear: External ear normal.      Nose: Nose normal.      Mouth/Throat:      Mouth: Mucous membranes are moist.      Pharynx: Oropharynx is clear.   Eyes:      Extraocular Movements: Extraocular movements intact.      Conjunctiva/sclera: Conjunctivae normal.      Pupils: Pupils are equal, round, and reactive to light.   Cardiovascular:      Rate and Rhythm: Normal rate.      Pulses: Normal pulses.   Pulmonary:      Effort: Pulmonary effort is normal.   Abdominal:      General: There is no distension.   Musculoskeletal:      Cervical back: Normal range " of motion. No rigidity.      Lumbar back: No bony tenderness. Decreased range of motion. Negative right straight leg raise test and negative left straight leg raise test.      Left hip: No tenderness, bony tenderness or crepitus. Normal range of motion (30 degree arc of rotation ). Normal strength.        Legs:    Skin:     General: Skin is warm and dry.      Capillary Refill: Capillary refill takes less than 2 seconds.   Neurological:      General: No focal deficit present.      Mental Status: He is alert and oriented to person, place, and time.   Psychiatric:         Mood and Affect: Mood normal.         Behavior: Behavior normal.           Radiology:      No radiology results for the last 90 days.           Assessment/Plan        ICD-10-CM ICD-9-CM   1. Primary osteoarthritis of left hip  M16.12 715.15         67-year-old male with moderate to advanced left hip osteoarthritis.  Secondary to the response with the previous injections the patient was provided with another order for a fluoroscopy guided left hip injection of which she has had significant provement previously.  The patient will return back on an as-needed basis upon any further complication or worsening of pain/symptoms.  Ultimately his goal is to exhaust all conservative treatment options before discussing total hip arthroplasty.  Follow-up when necessary.                 This document was signed by Greg Gilbert PA-C November 21, 2022     CC: Emigdio Iglesias MD

## 2022-12-13 ENCOUNTER — HOSPITAL ENCOUNTER (OUTPATIENT)
Dept: GENERAL RADIOLOGY | Facility: HOSPITAL | Age: 67
Discharge: HOME | End: 2022-12-13
Admitting: PHYSICIAN ASSISTANT

## 2022-12-13 VITALS
OXYGEN SATURATION: 99 % | DIASTOLIC BLOOD PRESSURE: 83 MMHG | RESPIRATION RATE: 16 BRPM | HEART RATE: 84 BPM | SYSTOLIC BLOOD PRESSURE: 135 MMHG

## 2022-12-13 DIAGNOSIS — M16.12 PRIMARY OSTEOARTHRITIS OF LEFT HIP: ICD-10-CM

## 2022-12-13 PROCEDURE — 20610 DRAIN/INJ JOINT/BURSA W/O US: CPT | Performed by: RADIOLOGY

## 2022-12-13 PROCEDURE — 77002 NEEDLE LOCALIZATION BY XRAY: CPT | Performed by: RADIOLOGY

## 2022-12-13 PROCEDURE — 25010000002 IOPAMIDOL 61 % SOLUTION: Performed by: RADIOLOGY

## 2022-12-13 PROCEDURE — 77002 NEEDLE LOCALIZATION BY XRAY: CPT

## 2022-12-13 PROCEDURE — 25010000002 TRIAMCINOLONE PER 10 MG: Performed by: RADIOLOGY

## 2022-12-13 RX ORDER — TRIAMCINOLONE ACETONIDE 40 MG/ML
40 INJECTION, SUSPENSION INTRA-ARTICULAR; INTRAMUSCULAR
Status: COMPLETED | OUTPATIENT
Start: 2022-12-13 | End: 2022-12-13

## 2022-12-13 RX ORDER — LIDOCAINE HYDROCHLORIDE 20 MG/ML
20 INJECTION, SOLUTION INFILTRATION; PERINEURAL
Status: COMPLETED | OUTPATIENT
Start: 2022-12-13 | End: 2022-12-13

## 2022-12-13 RX ORDER — BUPIVACAINE HYDROCHLORIDE 5 MG/ML
INJECTION, SOLUTION PERINEURAL AS NEEDED
Status: DISCONTINUED | OUTPATIENT
Start: 2022-12-13 | End: 2022-12-14 | Stop reason: HOSPADM

## 2022-12-13 RX ADMIN — IOPAMIDOL 3 ML: 612 INJECTION, SOLUTION INTRAVENOUS at 13:46

## 2022-12-13 RX ADMIN — BUPIVACAINE HYDROCHLORIDE 5 ML: 5 INJECTION, SOLUTION PERINEURAL at 13:46

## 2022-12-13 RX ADMIN — TRIAMCINOLONE ACETONIDE 40 MG: 40 INJECTION, SUSPENSION INTRA-ARTICULAR; INTRAMUSCULAR at 13:46

## 2022-12-13 RX ADMIN — LIDOCAINE HYDROCHLORIDE 5 ML: 20 INJECTION, SOLUTION INFILTRATION; PERINEURAL at 13:46

## 2023-11-16 ENCOUNTER — TRANSCRIBE ORDERS (OUTPATIENT)
Dept: PHYSICAL THERAPY | Facility: CLINIC | Age: 68
End: 2023-11-16
Payer: MEDICARE

## 2023-11-16 ENCOUNTER — TREATMENT (OUTPATIENT)
Dept: PHYSICAL THERAPY | Facility: CLINIC | Age: 68
End: 2023-11-16
Payer: MEDICARE

## 2023-11-16 DIAGNOSIS — S62.639B OPEN FRACTURE OF DISTAL PHALANX OF DIGIT OF LEFT HAND: ICD-10-CM

## 2023-11-16 DIAGNOSIS — S62.639B OPEN DISPLACED FRACTURE OF DISTAL PHALANX OF FINGER, UNSPECIFIED FINGER, INITIAL ENCOUNTER: Primary | ICD-10-CM

## 2023-11-16 DIAGNOSIS — M25.642 STIFFNESS OF LEFT HAND JOINT: Primary | ICD-10-CM

## 2023-11-16 DIAGNOSIS — Z98.890 H/O HAND SURGERY: ICD-10-CM

## 2023-11-16 DIAGNOSIS — R60.0 EDEMA OF HAND: ICD-10-CM

## 2023-11-16 DIAGNOSIS — M25.642 DECREASED RANGE OF MOTION OF FINGER OF LEFT HAND: ICD-10-CM

## 2023-11-16 DIAGNOSIS — M18.12 ARTHRITIS OF CARPOMETACARPAL (CMC) JOINT OF LEFT THUMB: ICD-10-CM

## 2023-11-16 DIAGNOSIS — M19.132 SCAPHOLUNATE ADVANCED COLLAPSE OF LEFT WRIST: ICD-10-CM

## 2023-11-16 DIAGNOSIS — M79.642 LEFT HAND PAIN: ICD-10-CM

## 2023-11-16 DIAGNOSIS — S68.119D AMPUTATION OF TIP OF FINGER, SUBSEQUENT ENCOUNTER: ICD-10-CM

## 2023-11-16 DIAGNOSIS — M25.632 DECREASED RANGE OF MOTION OF LEFT WRIST: ICD-10-CM
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
## 2023-11-20 ENCOUNTER — TREATMENT (OUTPATIENT)
Dept: PHYSICAL THERAPY | Facility: CLINIC | Age: 68
End: 2023-11-20
Payer: MEDICARE

## 2023-11-20 DIAGNOSIS — M25.642 STIFFNESS OF LEFT HAND JOINT: Primary | ICD-10-CM

## 2023-11-20 DIAGNOSIS — R60.0 EDEMA OF HAND: ICD-10-CM

## 2023-11-20 DIAGNOSIS — M25.642 DECREASED RANGE OF MOTION OF FINGER OF LEFT HAND: ICD-10-CM

## 2023-11-20 DIAGNOSIS — M79.642 LEFT HAND PAIN: ICD-10-CM

## 2023-11-20 DIAGNOSIS — S68.119D AMPUTATION OF TIP OF FINGER, SUBSEQUENT ENCOUNTER: ICD-10-CM

## 2023-11-20 DIAGNOSIS — Z98.890 H/O HAND SURGERY: ICD-10-CM

## 2023-11-20 DIAGNOSIS — M18.12 ARTHRITIS OF CARPOMETACARPAL (CMC) JOINT OF LEFT THUMB: ICD-10-CM

## 2023-11-20 DIAGNOSIS — M25.632 DECREASED RANGE OF MOTION OF LEFT WRIST: ICD-10-CM

## 2023-11-20 DIAGNOSIS — S62.639B OPEN FRACTURE OF DISTAL PHALANX OF DIGIT OF LEFT HAND: ICD-10-CM

## 2023-11-20 DIAGNOSIS — M19.132 SCAPHOLUNATE ADVANCED COLLAPSE OF LEFT WRIST: ICD-10-CM

## 2023-11-20 NOTE — PROGRESS NOTES
____________________________________________________________________    [facility]  Outpatient Rehabilitation  [address] [place] [address]  Phone: [phone] / Fax: [phone]       Occupational Therapy - Certified Hand Therapy  Treatment Note            Patient: Emmanuel Leahy Jr.   : 1955  Diagnosis/ICD-10 Code:  Stiffness of left hand joint [M25.642]  Referring practitioner: Alexi Schmidt PA-C  Date of Initial Visit: Type: THERAPY  Noted: 2023  Today's Date: 2023  Patient seen for 2 sessions    Visit Diagnoses:    ICD-10-CM ICD-9-CM   1. Stiffness of left hand joint  M25.642 719.54   2. Decreased range of motion of finger of left hand  M25.642 719.54   3. Decreased range of motion of left wrist  M25.632 719.53   4. Left hand pain  M79.642 729.5   5. Edema of hand  R60.0 782.3   6. Open fracture of distal phalanx of digit of left hand  S62.639B 816.12   7. Scapholunate advanced collapse of left wrist  M19.132 716.13   8. Arthritis of carpometacarpal (CMC) joint of left thumb  M18.12 716.94   9. Amputation of tip of finger, subsequent encounter  S68.119D V54.89     886.0   10. H/O hand surgery  Z98.890 V15.29              Subjective Evaluation    History of Present Illness  Date of surgery: 2023    Subjective comment: Pt reports no new concerns           Objective   See Exercise, Manual, and Modality Logs for complete treatment.           Assessment & Plan       Assessment  Assessment details: OT/CHT provided Pt with cues and instructions to complete therapist modified interventions focused on primarily left IF ROM and grasp as well as secondarily SL friendly muscle strengthening.    Pt completed left IF heat stretch and job mobs to improve ROM; Pt completed STM to improve skin mobility and de-sensitization, pt completed resistive wrist flexion with moderate cue to improve SL friendly muscle strength. Pt completed therapeutic u/s  focused on edema and inflammation reduction.     Of note, pt with Kinesio tape in place; pt report maybe slightly reduction in edema. Pt reports continued coban wrapping and elevation. Post heated stretch with no visual change in edema but educated that we must use heat with caution due to risk of increased edema.    Pt demonstrated good effort and motivation. Pt will benefit from continued OT/CHT treatment.           Prognosis: good          Progress per Plan of Care               Timed Codes        Manual Therapy:    19     mins  51087;    Therapeutic Exercise:    14     mins  20617;     Neuromuscular Tanika:    0    mins  16430;    Therapeutic Activity:     0     mins  54780;      Ultrasound:     10     mins  26679;    Community/ work    0     mins  95553  Self Care/ [place]    0     mins  66563  Sensory Re-Int.             0     mins   11128  Cognitve Re-train    0     mins  22488   Electrical Stimulation:    0     mins 36076      Un-timed Codes  Electrical Stimulation:    0     mins 38003 ( );      Timed Treatment:   43   mins   Total Treatment:     43   mins    OT SIGNATURE:       Occupational Therapist, Certified Hand Therapist    [place] License #[id number]  NPI #[id number]  Electronically signed by: Kenny D. Maynes, KAY/L, CHT, CKMULUGETA, CEAS 11/20/2023

## 2023-11-22 ENCOUNTER — TREATMENT (OUTPATIENT)
Dept: PHYSICAL THERAPY | Facility: CLINIC | Age: 68
End: 2023-11-22
Payer: MEDICARE

## 2023-11-22 DIAGNOSIS — M25.642 STIFFNESS OF LEFT HAND JOINT: Primary | ICD-10-CM

## 2023-11-22 DIAGNOSIS — R60.0 EDEMA OF HAND: ICD-10-CM

## 2023-11-22 DIAGNOSIS — M19.132 SCAPHOLUNATE ADVANCED COLLAPSE OF LEFT WRIST: ICD-10-CM

## 2023-11-22 DIAGNOSIS — S62.639B OPEN FRACTURE OF DISTAL PHALANX OF DIGIT OF LEFT HAND: ICD-10-CM

## 2023-11-22 DIAGNOSIS — S68.119D AMPUTATION OF TIP OF FINGER, SUBSEQUENT ENCOUNTER: ICD-10-CM

## 2023-11-22 DIAGNOSIS — Z98.890 H/O HAND SURGERY: ICD-10-CM

## 2023-11-22 DIAGNOSIS — M18.12 ARTHRITIS OF CARPOMETACARPAL (CMC) JOINT OF LEFT THUMB: ICD-10-CM

## 2023-11-22 DIAGNOSIS — M25.632 DECREASED RANGE OF MOTION OF LEFT WRIST: ICD-10-CM

## 2023-11-22 DIAGNOSIS — M79.642 LEFT HAND PAIN: ICD-10-CM

## 2023-11-22 DIAGNOSIS — M25.642 DECREASED RANGE OF MOTION OF FINGER OF LEFT HAND: ICD-10-CM

## 2023-11-22 NOTE — PROGRESS NOTES
____________________________________________________________________    [facility]  Outpatient Rehabilitation  [address] [place] [address]  Phone: [phone] / Fax: [phone]       Occupational Therapy - Certified Hand Therapy  Treatment Note            Patient: Emmanuel Leahy Jr.   : 1955  Diagnosis/ICD-10 Code:  Stiffness of left hand joint [M25.642]  Referring practitioner: Alexi Schmidt PA-C  Date of Initial Visit: Type: THERAPY  Noted: 2023  Today's Date: 2023  Patient seen for 3 sessions    Visit Diagnoses:    ICD-10-CM ICD-9-CM   1. Stiffness of left hand joint  M25.642 719.54   2. Decreased range of motion of finger of left hand  M25.642 719.54   3. Decreased range of motion of left wrist  M25.632 719.53   4. Left hand pain  M79.642 729.5   5. Edema of hand  R60.0 782.3   6. Open fracture of distal phalanx of digit of left hand  S62.639B 816.12   7. Scapholunate advanced collapse of left wrist  M19.132 716.13   8. Arthritis of carpometacarpal (CMC) joint of left thumb  M18.12 716.94   9. Amputation of tip of finger, subsequent encounter  S68.119D V54.89     886.0   10. H/O hand surgery  Z98.890 V15.29              Subjective Evaluation    History of Present Illness  Date of surgery: 2023    Subjective comment: Pt reports that he is about to go to outside larger hospital because a friend in currently in heart surgey; pt concerned about covering IF wound/healing incision           Objective   See Exercise, Manual, and Modality Logs for complete treatment.           Assessment & Plan       Assessment  Assessment details: OT/CHT provided Pt with cues and instructions to complete therapist modified interventions focused on left IF ROM and wound care.     Pt completed therapeutic u/s focused on edema and inflammation reduction.   Pt completed left IF stretch and joint mobs to improve ROM; Pt completed IASTM to improve skin mobility  and de-sensitization, pt provided with light debridement of eschar around wound/ incision edges until trimmed back to deep center area; pt concerned with covering due to going to a hospital to visit a friend; Pt provided with Xeroform covering and temporary clam shell tip protection.   Of note, pt with Kinesio tape in place; pt report maybe slightly reduction in edema. Pt reports continued coban wrapping and elevation. Post heated stretch with no visual change in edema but educated that we must use heat with caution due to risk of increased edema.    Pt demonstrated good effort and motivation. Pt will benefit from continued OT/CHT treatment.           Prognosis: good          Progress per Plan of Care               Timed Codes        Manual Therapy:    11     mins  28712;    Therapeutic Exercise:    14     mins  04608;     Neuromuscular Tanika:    0    mins  75427;    Therapeutic Activity:     33      mins  62053;      Ultrasound:     10     mins  47695;    Community/ work    0     mins  67225  Self Care/ Warner    0     mins  56204  Sensory Re-Int.             0     mins   91155  Cognitve Re-train    0     mins  89119   Electrical Stimulation:    0     mins 61843      Un-timed Codes  Electrical Stimulation:    0     mins 67940 ( );      Timed Treatment:   68   mins   Total Treatment:     68   mins    OT SIGNATURE:       Occupational Therapist, Certified Hand Therapist    [place] License #[id number]  NPI #[id number]  Electronically signed by: Kenny D. Maynes, KAY/L, DOTTYT, GHANSHYAM, YAMILETH 11/22/2023

## 2023-11-27 ENCOUNTER — TREATMENT (OUTPATIENT)
Dept: PHYSICAL THERAPY | Facility: CLINIC | Age: 68
End: 2023-11-27
Payer: MEDICARE

## 2023-11-27 DIAGNOSIS — S68.119D AMPUTATION OF TIP OF FINGER, SUBSEQUENT ENCOUNTER: ICD-10-CM

## 2023-11-27 DIAGNOSIS — M25.642 DECREASED RANGE OF MOTION OF FINGER OF LEFT HAND: ICD-10-CM

## 2023-11-27 DIAGNOSIS — M25.632 DECREASED RANGE OF MOTION OF LEFT WRIST: ICD-10-CM

## 2023-11-27 DIAGNOSIS — R60.0 EDEMA OF HAND: ICD-10-CM

## 2023-11-27 DIAGNOSIS — M25.642 STIFFNESS OF LEFT HAND JOINT: Primary | ICD-10-CM

## 2023-11-27 DIAGNOSIS — Z98.890 H/O HAND SURGERY: ICD-10-CM

## 2023-11-27 DIAGNOSIS — M19.132 SCAPHOLUNATE ADVANCED COLLAPSE OF LEFT WRIST: ICD-10-CM

## 2023-11-27 DIAGNOSIS — S62.639B OPEN FRACTURE OF DISTAL PHALANX OF DIGIT OF LEFT HAND: ICD-10-CM

## 2023-11-27 DIAGNOSIS — M18.12 ARTHRITIS OF CARPOMETACARPAL (CMC) JOINT OF LEFT THUMB: ICD-10-CM

## 2023-11-27 DIAGNOSIS — M79.642 LEFT HAND PAIN: ICD-10-CM

## 2023-11-27 PROCEDURE — 97110 THERAPEUTIC EXERCISES: CPT | Performed by: OCCUPATIONAL THERAPIST

## 2023-11-27 PROCEDURE — 97530 THERAPEUTIC ACTIVITIES: CPT | Performed by: OCCUPATIONAL THERAPIST

## 2023-11-27 PROCEDURE — 97140 MANUAL THERAPY 1/> REGIONS: CPT | Performed by: OCCUPATIONAL THERAPIST

## 2023-11-27 NOTE — PROGRESS NOTES
____________________________________________________________________    [facility]  Outpatient Rehabilitation  [address] [place] [address]  Phone: [phone] / Fax: [phone]       Occupational Therapy - Certified Hand Therapy  Treatment Note            Patient: Emmanuel Leahy Jr.   : 1955  Diagnosis/ICD-10 Code:  Stiffness of left hand joint [M25.642]  Referring practitioner: Alexi Schmidt PA-C  Date of Initial Visit: Type: THERAPY  Noted: 2023  Today's Date: 2023  Patient seen for 4 sessions    Visit Diagnoses:    ICD-10-CM ICD-9-CM   1. Stiffness of left hand joint  M25.642 719.54   2. Decreased range of motion of finger of left hand  M25.642 719.54   3. Decreased range of motion of left wrist  M25.632 719.53   4. Left hand pain  M79.642 729.5   5. Edema of hand  R60.0 782.3   6. Open fracture of distal phalanx of digit of left hand  S62.639B 816.12   7. Scapholunate advanced collapse of left wrist  M19.132 716.13   8. Arthritis of carpometacarpal (CMC) joint of left thumb  M18.12 716.94   9. Amputation of tip of finger, subsequent encounter  S68.119D V54.89     886.0   10. H/O hand surgery  Z98.890 V15.29              Subjective Evaluation    History of Present Illness  Date of surgery: 2023    Subjective comment: Pt reports no new concerns. Pt reports good HEP adherence.           Objective   See Exercise, Manual, and Modality Logs for complete treatment.             Assessment & Plan       Assessment  Assessment details: OT/CHT provided Pt with cues and instructions to complete therapist modified interventions focused on left IF ROM and wound care.       Pt completed left IF stretch and joint mobs to improve ROM; Pt completed IASTM to improve skin mobility, pt provided with light debridement of eschar around wound/ incision edges until trimmed back to deeper center area; Pt provided with Xeroform covering and states he has his  clam shell tip protection in car to slide over. Pt provided mod/min cues to completed A/PROM including joint blocking and modified HEP. Pt appeared visually to have slightly decreased edema this session.    Pt demonstrated good effort and motivation. Pt will benefit from continued OT/CHT treatment.           Prognosis: good          Progress per Plan of Care               Timed Codes        Manual Therapy:    11     mins  62639;    Therapeutic Exercise:    29     mins  97193;     Neuromuscular Tanika:    0    mins  16078;    Therapeutic Activity:     15      mins  10544;      Ultrasound:     0     mins  39178;    Community/ work    0     mins  12525  Self Care/ Warner    0     mins  98634  Sensory Re-Int.             0     mins   45095  Cognitve Re-train    0     mins  48172   Electrical Stimulation:    0     mins 85500      Un-timed Codes  Electrical Stimulation:    0     mins 24154 ( );      Timed Treatment:   55   mins   Total Treatment:     55   mins    OT SIGNATURE:       Occupational Therapist, Certified Hand Therapist    [place] License #[id number]  NPI #[id number]  Electronically signed by: Kenny D. Maynes, KAY/L, CHT, CKMULUGEAT, YAMILETH 11/27/2023

## 2023-11-29 ENCOUNTER — TREATMENT (OUTPATIENT)
Dept: PHYSICAL THERAPY | Facility: CLINIC | Age: 68
End: 2023-11-29
Payer: MEDICARE

## 2023-11-29 DIAGNOSIS — S62.639B OPEN FRACTURE OF DISTAL PHALANX OF DIGIT OF LEFT HAND: ICD-10-CM

## 2023-11-29 DIAGNOSIS — M19.132 SCAPHOLUNATE ADVANCED COLLAPSE OF LEFT WRIST: ICD-10-CM

## 2023-11-29 DIAGNOSIS — S68.119D AMPUTATION OF TIP OF FINGER, SUBSEQUENT ENCOUNTER: ICD-10-CM

## 2023-11-29 DIAGNOSIS — R60.0 EDEMA OF HAND: ICD-10-CM

## 2023-11-29 DIAGNOSIS — M25.642 DECREASED RANGE OF MOTION OF FINGER OF LEFT HAND: ICD-10-CM

## 2023-11-29 DIAGNOSIS — Z98.890 H/O HAND SURGERY: ICD-10-CM

## 2023-11-29 DIAGNOSIS — M25.632 DECREASED RANGE OF MOTION OF LEFT WRIST: ICD-10-CM

## 2023-11-29 DIAGNOSIS — M18.12 ARTHRITIS OF CARPOMETACARPAL (CMC) JOINT OF LEFT THUMB: ICD-10-CM

## 2023-11-29 DIAGNOSIS — M79.642 LEFT HAND PAIN: ICD-10-CM

## 2023-11-29 DIAGNOSIS — M25.642 STIFFNESS OF LEFT HAND JOINT: Primary | ICD-10-CM

## 2023-11-29 NOTE — PROGRESS NOTES
____________________________________________________________________    [facility]  Outpatient Rehabilitation  [address] [place] [address]  Phone: [phone] / Fax: [phone]       Occupational Therapy - Certified Hand Therapy  Treatment Note            Patient: Emmanuel Leahy Jr.   : 1955  Diagnosis/ICD-10 Code:  Stiffness of left hand joint [M25.642]  Referring practitioner: Alexi Schmidt PA-C  Date of Initial Visit: Type: THERAPY  Noted: 2023  Today's Date: 2023  Patient seen for 5 sessions    Visit Diagnoses:    ICD-10-CM ICD-9-CM   1. Stiffness of left hand joint  M25.642 719.54   2. Decreased range of motion of finger of left hand  M25.642 719.54   3. Decreased range of motion of left wrist  M25.632 719.53   4. Left hand pain  M79.642 729.5   5. Edema of hand  R60.0 782.3   6. Open fracture of distal phalanx of digit of left hand  S62.639B 816.12   7. Scapholunate advanced collapse of left wrist  M19.132 716.13   8. Arthritis of carpometacarpal (CMC) joint of left thumb  M18.12 716.94   9. Amputation of tip of finger, subsequent encounter  S68.119D V54.89     886.0   10. H/O hand surgery  Z98.890 V15.29              Subjective Evaluation    History of Present Illness  Date of surgery: 2023    Subjective comment: Pt reports no new concerns. Pt reports good HEP adherence.           Objective   See Exercise, Manual, and Modality Logs for complete treatment.             Assessment & Plan       Assessment  Assessment details: OT/CHT provided Pt with cues and instructions to complete therapist modified interventions focused on left IF ROM and wound care.       Pt completed left IF stretch and joint mobs to improve ROM; Pt completed IASTM to improve skin mobility.  Pt provided mod/min cues to completed A/PROM including joint blocking and resistive . Pt provided finger glides to improve ROM.  Pt appeared visually to have slightly  decreased edema this session. Pt wound continues to heal with no s/s of infection at this time; no additional light debridement attempted this session.       Pt demonstrated good effort and motivation. Pt will benefit from continued OT/CHT treatment.           Prognosis: good    Plan  Treatment plan discussed with: patient          Progress per Plan of Care               Timed Codes        Manual Therapy:    13     mins  77368;    Therapeutic Exercise:    24     mins  38154;     Neuromuscular Tanika:    0    mins  32054;    Therapeutic Activity:     17      mins  17627;      Ultrasound:     0     mins  72213;    Community/ work    0     mins  59282  Self Care/ Warner    0     mins  90543  Sensory Re-Int.             0     mins   91376  Cognitve Re-train    0     mins  52219   Electrical Stimulation:    0     mins 80592      Un-timed Codes  Electrical Stimulation:    0     mins 16329 ( );      Timed Treatment:   54   mins   Total Treatment:     54   mins    OT SIGNATURE:       Occupational Therapist, Certified Hand Therapist    [place] License #[id number]  NPI #[id number]  Electronically signed by: Kenny D. Maynes, KAY/L, CHT, CKMULUGETA, YAMILETH 11/29/2023

## 2023-12-04 ENCOUNTER — TREATMENT (OUTPATIENT)
Dept: PHYSICAL THERAPY | Facility: CLINIC | Age: 68
End: 2023-12-04
Payer: MEDICARE

## 2023-12-04 DIAGNOSIS — M25.642 STIFFNESS OF LEFT HAND JOINT: Primary | ICD-10-CM

## 2023-12-04 DIAGNOSIS — M25.632 DECREASED RANGE OF MOTION OF LEFT WRIST: ICD-10-CM

## 2023-12-04 DIAGNOSIS — S68.119D AMPUTATION OF TIP OF FINGER, SUBSEQUENT ENCOUNTER: ICD-10-CM

## 2023-12-04 DIAGNOSIS — R60.0 EDEMA OF HAND: ICD-10-CM

## 2023-12-04 DIAGNOSIS — Z98.890 H/O HAND SURGERY: ICD-10-CM

## 2023-12-04 DIAGNOSIS — S62.639B OPEN FRACTURE OF DISTAL PHALANX OF DIGIT OF LEFT HAND: ICD-10-CM

## 2023-12-04 DIAGNOSIS — M25.642 DECREASED RANGE OF MOTION OF FINGER OF LEFT HAND: ICD-10-CM

## 2023-12-04 DIAGNOSIS — M19.132 SCAPHOLUNATE ADVANCED COLLAPSE OF LEFT WRIST: ICD-10-CM

## 2023-12-04 DIAGNOSIS — M18.12 ARTHRITIS OF CARPOMETACARPAL (CMC) JOINT OF LEFT THUMB: ICD-10-CM

## 2023-12-04 DIAGNOSIS — M79.642 LEFT HAND PAIN: ICD-10-CM

## 2023-12-04 PROCEDURE — 97140 MANUAL THERAPY 1/> REGIONS: CPT | Performed by: OCCUPATIONAL THERAPIST

## 2023-12-04 PROCEDURE — 97530 THERAPEUTIC ACTIVITIES: CPT | Performed by: OCCUPATIONAL THERAPIST

## 2023-12-04 PROCEDURE — 97110 THERAPEUTIC EXERCISES: CPT | Performed by: OCCUPATIONAL THERAPIST

## 2023-12-04 PROCEDURE — 97032 APPL MODALITY 1+ESTIM EA 15: CPT | Performed by: OCCUPATIONAL THERAPIST

## 2023-12-04 NOTE — PROGRESS NOTES
____________________________________________________________________    [facility]  Outpatient Rehabilitation  [address] [place] [address]  Phone: [phone] / Fax: [phone]       Occupational Therapy - Certified Hand Therapy  Treatment Note            Patient: Emmanuel Leahy Jr.   : 1955  Diagnosis/ICD-10 Code:  Stiffness of left hand joint [M25.642]  Referring practitioner: Alexi Schmidt PA-C  Date of Initial Visit: Type: THERAPY  Noted: 2023  Today's Date: 2023  Patient seen for 6 sessions    Visit Diagnoses:    ICD-10-CM ICD-9-CM   1. Stiffness of left hand joint  M25.642 719.54   2. Decreased range of motion of finger of left hand  M25.642 719.54   3. Decreased range of motion of left wrist  M25.632 719.53   4. Left hand pain  M79.642 729.5   5. Edema of hand  R60.0 782.3   6. Open fracture of distal phalanx of digit of left hand  S62.639B 816.12   7. Scapholunate advanced collapse of left wrist  M19.132 716.13   8. Arthritis of carpometacarpal (CMC) joint of left thumb  M18.12 716.94   9. Amputation of tip of finger, subsequent encounter  S68.119D V54.89     886.0   10. H/O hand surgery  Z98.890 V15.29              Subjective Evaluation    History of Present Illness  Date of surgery: 2023    Subjective comment: Pt reports no new concerns. Pt reports good HEP adherence. Pt reports trimming some dead skin and stutures from fingertip wound since last session.           Objective   See Exercise, Manual, and Modality Logs for complete treatment.                 S-L HEP= 5 sec hold, 15-20 reps; 3-5 times per day:            Assessment & Plan       Assessment  Assessment details: OT/CHT provided Pt with cues and instructions to complete therapist modified interventions focused on left IF ROM and wound care.     Pt completed left IF stretch and joint mobs to improve ROM; Pt completed STM to improve skin mobility and edema.  Pt provided  mod/min cues to completed AROM including including wrist SL friendly isometrics. Pt provided finger glides to improve ROM.       Pt demonstrated very near normal left IF AROM post-treatment with reports of stiffness/soreness remaining. Pt wound with slightly wet appearance this session and dressing applied to decreased wetness this session.     Pt demonstrated good effort and motivation. Pt will benefit from continued OT/CHT treatment.           Prognosis: good    Plan  Treatment plan discussed with: patient          Progress per Plan of Care               Timed Codes        Manual Therapy:    16     mins  57866;    Therapeutic Exercise:    18     mins  15919;     Neuromuscular Tanika:    0    mins  18098;    Therapeutic Activity:     11      mins  32274;      Ultrasound:     0     mins  29663;    Community/ work    0     mins  65541  Self Care/ Warner    0     mins  40945  Sensory Re-Int.             0     mins   72560  Cognitve Re-train    0     mins  97786   Electrical Stimulation:    10     mins 79264      Un-timed Codes  Electrical Stimulation:    0     mins 39666 ( );      Timed Treatment:   55   mins   Total Treatment:     55   mins    OT SIGNATURE:       Occupational Therapist, Certified Hand Therapist    [place] License #[id number]  NPI #[id number]  Electronically signed by: Kenny D. Maynes, KAY/L, CHT, GHANSHYAM, YAMILETH 12/4/2023

## 2023-12-08 ENCOUNTER — TREATMENT (OUTPATIENT)
Dept: PHYSICAL THERAPY | Facility: CLINIC | Age: 68
End: 2023-12-08
Payer: MEDICARE

## 2023-12-08 DIAGNOSIS — M19.132 SCAPHOLUNATE ADVANCED COLLAPSE OF LEFT WRIST: ICD-10-CM

## 2023-12-08 DIAGNOSIS — M25.642 DECREASED RANGE OF MOTION OF FINGER OF LEFT HAND: ICD-10-CM

## 2023-12-08 DIAGNOSIS — M25.642 STIFFNESS OF LEFT HAND JOINT: Primary | ICD-10-CM

## 2023-12-08 DIAGNOSIS — S62.639B OPEN FRACTURE OF DISTAL PHALANX OF DIGIT OF LEFT HAND: ICD-10-CM

## 2023-12-08 DIAGNOSIS — R60.0 EDEMA OF HAND: ICD-10-CM

## 2023-12-08 DIAGNOSIS — M79.642 LEFT HAND PAIN: ICD-10-CM

## 2023-12-08 DIAGNOSIS — Z98.890 H/O HAND SURGERY: ICD-10-CM

## 2023-12-08 DIAGNOSIS — S68.119D AMPUTATION OF TIP OF FINGER, SUBSEQUENT ENCOUNTER: ICD-10-CM

## 2023-12-08 DIAGNOSIS — M18.12 ARTHRITIS OF CARPOMETACARPAL (CMC) JOINT OF LEFT THUMB: ICD-10-CM

## 2023-12-08 DIAGNOSIS — M25.632 DECREASED RANGE OF MOTION OF LEFT WRIST: ICD-10-CM

## 2023-12-08 NOTE — PROGRESS NOTES
____________________________________________________________________    [facility]  Outpatient Rehabilitation  [address] [place] [address]  Phone: [phone] / Fax: [phone]       Occupational Therapy - Certified Hand Therapy  Treatment Note            Patient: Emmanuel Leahy Jr.   : 1955  Diagnosis/ICD-10 Code:  Stiffness of left hand joint [M25.642]  Referring practitioner: Alexi Schmidt PA-C  Date of Initial Visit: Type: THERAPY  Noted: 2023  Today's Date: 2023  Patient seen for 7 sessions    Visit Diagnoses:    ICD-10-CM ICD-9-CM   1. Stiffness of left hand joint  M25.642 719.54   2. Decreased range of motion of finger of left hand  M25.642 719.54   3. Decreased range of motion of left wrist  M25.632 719.53   4. Left hand pain  M79.642 729.5   5. Edema of hand  R60.0 782.3   6. Open fracture of distal phalanx of digit of left hand  S62.639B 816.12   7. Arthritis of carpometacarpal (CMC) joint of left thumb  M18.12 716.94   8. Scapholunate advanced collapse of left wrist  M19.132 716.13   9. Amputation of tip of finger, subsequent encounter  S68.119D V54.89     886.0   10. H/O hand surgery  Z98.890 V15.29              Subjective Evaluation    History of Present Illness  Date of surgery: 2023    Subjective comment: Pt reports no new concerns. Pt reports good HEP adherence.           Objective   See Exercise, Manual, and Modality Logs for complete treatment.                 S-L HEP= 5 sec hold, 15-20 reps; 3-5 times per day:            Assessment & Plan       Assessment  Assessment details: OT/CHT provided Pt with cues and instructions to complete therapist modified interventions focused on left IF ROM and wound care.     Pt interventions this session focused on IF joint stiffness, as pt now demonstrates full ROM, wound care, and STM. Pt requesting d/c on Monday stating he is going out of town for an extended time. Pt states he may  be back in the future if his surgeon wants him to have more therapy or possible for his wrist as it was treated in a secondary manor with recent finger amputation being the primary focus of this OT/CHT episode.     Pt has achieved ROM goals, pt fingertip wound continues to heal having one small half a Q-tip sized section yet to heal completely. Discussed possible HEP to start once wound is full healed including massage, sensory desensitization and re-education if needed, as well as possible finger cap use. Pt with good verbal return.      Pt demonstrated very near normal left IF AROM post-treatment with reports of stiffness/soreness remaining. Pt wound with slightly wet appearance this session and dressing applied to decreased wetness this session.     Pt demonstrated good effort and motivation. Pt will benefit from continued OT/CHT treatment; anticipate OT/CHT d/c next visit at pt request.        Prognosis: good    Plan  Treatment plan discussed with: patient          Anticipate DC next Visit               Timed Codes        Manual Therapy:    16     mins  43365;    Therapeutic Exercise:    13     mins  88851;     Neuromuscular Tanika:    12    mins  04186;    Therapeutic Activity:     0      mins  28896;      Ultrasound:     0     mins  38557;    Community/ work    0     mins  70868  Self Care/ Warner    0     mins  42136  Sensory Re-Int.             0     mins   26899  Cognitve Re-train    0     mins  62650   Electrical Stimulation:    0     mins 27010      Un-timed Codes  Electrical Stimulation:    0     mins 18329 ( );      Timed Treatment:   41   mins   Total Treatment:     41   mins    OT SIGNATURE:       Occupational Therapist, Certified Hand Therapist    [place] License #[id number]  NPI #[id number]  Electronically signed by: Kenny D. Maynes, KAY/L, CHT, GHANSHYAM, YAMILETH 12/8/2023

## 2023-12-11 ENCOUNTER — TREATMENT (OUTPATIENT)
Dept: PHYSICAL THERAPY | Facility: CLINIC | Age: 68
End: 2023-12-11
Payer: MEDICARE

## 2023-12-11 DIAGNOSIS — M25.632 DECREASED RANGE OF MOTION OF LEFT WRIST: ICD-10-CM

## 2023-12-11 DIAGNOSIS — M79.642 LEFT HAND PAIN: ICD-10-CM

## 2023-12-11 DIAGNOSIS — S68.119D AMPUTATION OF TIP OF FINGER, SUBSEQUENT ENCOUNTER: ICD-10-CM

## 2023-12-11 DIAGNOSIS — M25.642 STIFFNESS OF LEFT HAND JOINT: Primary | ICD-10-CM

## 2023-12-11 DIAGNOSIS — M25.642 DECREASED RANGE OF MOTION OF FINGER OF LEFT HAND: ICD-10-CM

## 2023-12-11 DIAGNOSIS — S62.639B OPEN FRACTURE OF DISTAL PHALANX OF DIGIT OF LEFT HAND: ICD-10-CM

## 2023-12-11 DIAGNOSIS — M19.132 SCAPHOLUNATE ADVANCED COLLAPSE OF LEFT WRIST: ICD-10-CM

## 2023-12-11 DIAGNOSIS — R60.0 EDEMA OF HAND: ICD-10-CM

## 2023-12-11 DIAGNOSIS — Z98.890 H/O HAND SURGERY: ICD-10-CM

## 2023-12-11 DIAGNOSIS — M18.12 ARTHRITIS OF CARPOMETACARPAL (CMC) JOINT OF LEFT THUMB: ICD-10-CM
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
## 2024-07-22 ENCOUNTER — HOSPITAL ENCOUNTER (OUTPATIENT)
Facility: HOSPITAL | Age: 69
Discharge: HOME | End: 2024-07-22
Admitting: NURSE PRACTITIONER
Payer: MEDICARE

## 2024-07-22 ENCOUNTER — TRANSCRIBE ORDERS (OUTPATIENT)
Dept: ADMINISTRATIVE | Facility: HOSPITAL | Age: 69
End: 2024-07-22
Payer: MEDICARE

## 2024-07-22 DIAGNOSIS — M79.89 SWELLING OF LEFT LOWER EXTREMITY: Primary | ICD-10-CM

## 2024-07-22 DIAGNOSIS — M79.89 SWELLING OF LEFT LOWER EXTREMITY: ICD-10-CM

## 2024-07-22 PROCEDURE — 93971 EXTREMITY STUDY: CPT | Performed by: RADIOLOGY

## 2024-07-22 PROCEDURE — 93971 EXTREMITY STUDY: CPT

## (undated) DEVICE — KT MANIFOLD CATHLAB CUST

## (undated) DEVICE — ANGIO-SEAL EVOLUTION VASCULAR CLOSURE DEVICE: Brand: ANGIO-SEAL

## (undated) DEVICE — GW J TP FIX CORE .035 150

## (undated) DEVICE — INTRO SHEATH ART/FEM ENGAGE .038 6F12CM

## (undated) DEVICE — CATH DIAG EXPO M/ PK 6FR FL4/FR4 PIG 3PK

## (undated) DEVICE — PK CATH CARD 10